# Patient Record
Sex: FEMALE | Race: WHITE | NOT HISPANIC OR LATINO | Employment: PART TIME | URBAN - METROPOLITAN AREA
[De-identification: names, ages, dates, MRNs, and addresses within clinical notes are randomized per-mention and may not be internally consistent; named-entity substitution may affect disease eponyms.]

---

## 2017-02-28 ASSESSMENT — PATIENT HEALTH QUESTIONNAIRE - PHQ9
10. IF YOU CHECKED OFF ANY PROBLEMS, HOW DIFFICULT HAVE THESE PROBLEMS MADE IT FOR YOU TO DO YOUR WORK, TAKE CARE OF THINGS AT HOME, OR GET ALONG WITH OTHER PEOPLE: VERY DIFFICULT
SUM OF ALL RESPONSES TO PHQ QUESTIONS 1-9: 22

## 2017-03-01 ENCOUNTER — OFFICE VISIT (OUTPATIENT)
Dept: FAMILY MEDICINE | Facility: CLINIC | Age: 52
End: 2017-03-01
Attending: FAMILY MEDICINE
Payer: COMMERCIAL

## 2017-03-01 VITALS — HEIGHT: 64 IN | SYSTOLIC BLOOD PRESSURE: 146 MMHG | DIASTOLIC BLOOD PRESSURE: 92 MMHG | HEART RATE: 65 BPM

## 2017-03-01 DIAGNOSIS — F43.23 ADJUSTMENT DISORDER WITH MIXED ANXIETY AND DEPRESSED MOOD: ICD-10-CM

## 2017-03-01 DIAGNOSIS — E03.9 HYPOTHYROIDISM, UNSPECIFIED TYPE: Primary | ICD-10-CM

## 2017-03-01 DIAGNOSIS — Z13.220 LIPID SCREENING: ICD-10-CM

## 2017-03-01 LAB
CHOLEST SERPL-MCNC: 231 MG/DL
HDLC SERPL-MCNC: 72 MG/DL
LDLC SERPL CALC-MCNC: 137 MG/DL
NONHDLC SERPL-MCNC: 159 MG/DL
T4 FREE SERPL-MCNC: 1.37 NG/DL (ref 0.76–1.46)
TRIGL SERPL-MCNC: 110 MG/DL
TSH SERPL DL<=0.05 MIU/L-ACNC: 1.14 MU/L (ref 0.4–4)

## 2017-03-01 PROCEDURE — 84439 ASSAY OF FREE THYROXINE: CPT | Performed by: FAMILY MEDICINE

## 2017-03-01 PROCEDURE — 36415 COLL VENOUS BLD VENIPUNCTURE: CPT | Performed by: FAMILY MEDICINE

## 2017-03-01 PROCEDURE — 80061 LIPID PANEL: CPT | Performed by: FAMILY MEDICINE

## 2017-03-01 PROCEDURE — 99212 OFFICE O/P EST SF 10 MIN: CPT | Mod: ZF

## 2017-03-01 PROCEDURE — 84443 ASSAY THYROID STIM HORMONE: CPT | Performed by: FAMILY MEDICINE

## 2017-03-01 RX ORDER — ESCITALOPRAM OXALATE 10 MG/1
10 TABLET ORAL DAILY
Qty: 30 TABLET | Refills: 0 | Status: SHIPPED | OUTPATIENT
Start: 2017-03-01 | End: 2017-03-29

## 2017-03-01 ASSESSMENT — ANXIETY QUESTIONNAIRES
3. WORRYING TOO MUCH ABOUT DIFFERENT THINGS: MORE THAN HALF THE DAYS
7. FEELING AFRAID AS IF SOMETHING AWFUL MIGHT HAPPEN: SEVERAL DAYS
1. FEELING NERVOUS, ANXIOUS, OR ON EDGE: MORE THAN HALF THE DAYS
5. BEING SO RESTLESS THAT IT IS HARD TO SIT STILL: NOT AT ALL
6. BECOMING EASILY ANNOYED OR IRRITABLE: NEARLY EVERY DAY
GAD7 TOTAL SCORE: 12
2. NOT BEING ABLE TO STOP OR CONTROL WORRYING: MORE THAN HALF THE DAYS

## 2017-03-01 ASSESSMENT — PATIENT HEALTH QUESTIONNAIRE - PHQ9: 5. POOR APPETITE OR OVEREATING: MORE THAN HALF THE DAYS

## 2017-03-01 NOTE — NURSING NOTE
Chief Complaint   Patient presents with     Follow Up For     Depression f/u/Discuss meds/TSH lab       See JENNIFER Diego 3/1/2017

## 2017-03-01 NOTE — MR AVS SNAPSHOT
After Visit Summary   3/1/2017    Cassandra Burgess    MRN: [id number]           Patient Information     Date Of Birth          1965        Visit Information        Provider Department      3/1/2017 11:00 AM Roland Narayan MD [facility]        Today's Diagnoses     Hypothyroidism, unspecified type    -  1    Lipid screening        Adjustment disorder with mixed anxiety and depressed mood           Follow-ups after your visit        Your next 10 appointments already scheduled     Mar 22, 2017 11:20 AM CDT   Return Visit with Roland Narayan MD   [facility] ([facility])    [facility]  [address]  [address]  [facility] [address]  [facility] [address]   [phone]              Who to contact     Please call your clinic at [phone] to:    Ask questions about your health    Make or cancel appointments    Discuss your medicines    Learn about your test results    Speak to your doctor   If you have compliments or concerns about an experience at your clinic, or if you wish to file a complaint, please contact [facility] Patient Relations at [phone] or email us at [email or web address]         Additional Information About Your Visit        MyChart Information     [employer] gives you secure access to your electronic health record. If you see a primary care provider, you can also send messages to your care team and make appointments. If you have questions, please call your primary care clinic.  If you do not have a primary care provider, please call [phone] and they will assist you.      [employer] is an electronic gateway that provides easy, online access to your medical records. With [employer], you can request a clinic appointment, read your test results, renew a prescription or communicate with your care team.     To access your existing account, please contact your [facility]  "Clinic or call [phone] for assistance.        Care EveryWhere ID     This is your Care EveryWhere ID. This could be used by other organizations to access your [place] medical records  [id number]        Your Vitals Were     Pulse Height Breastfeeding?             65 1.613 m (5' 3.5\") No          Blood Pressure from Last 3 Encounters:   03/01/17 (!) 146/92   07/13/16 124/85   05/18/16 135/88    Weight from Last 3 Encounters:   07/13/16 90.3 kg (199 lb)   05/18/16 85.3 kg (188 lb)              We Performed the Following     Lipid Profile     Thyroxine, Free     TSH          Today's Medication Changes          These changes are accurate as of: 3/1/17 12:11 PM.  If you have any questions, ask your nurse or doctor.               Start taking these medicines.        Dose/Directions    escitalopram 10 MG tablet   Commonly known as:  LEXAPRO   Used for:  Adjustment disorder with mixed anxiety and depressed mood   Started by:  Roland Narayan MD        Dose:  10 mg   Take 1 tablet (10 mg) by mouth daily   Quantity:  30 tablet   Refills:  0            Where to get your medicines      These medications were sent to [place] [id number] [place] [id number]    Hours:  M-F 9-7 SAT 9-6 SUN 11-3 Phone:  [phone]     escitalopram 10 MG tablet                Primary Care Provider Office Phone # Fax #    Becky Gaspar [phone]       [address] [address]        Thank you!     Thank you for choosing [facility]  for your care. Our goal is always to provide you with excellent care. Hearing back from our patients is one way we can continue to improve our services. Please take a few minutes to complete the written survey that you may receive in the mail after your visit with us. Thank you!             Your Updated Medication List - Protect others around you: Learn how " to safely use, store and throw away your medicines at www.disposemymeds.org.          This list is accurate as of: 3/1/17 12:11 PM.  Always use your most recent med list.                   Brand Name Dispense Instructions for use    CALCIUM MAGNESIUM PO      Take by mouth daily       escitalopram 10 MG tablet    LEXAPRO    30 tablet    Take 1 tablet (10 mg) by mouth daily       levothyroxine 137 MCG tablet    SYNTHROID/LEVOTHROID    90 tablet    Take 1 tablet (137 mcg) by mouth daily       MULTIVITAMIN PO      Take 4 capsules by mouth daily 50 and wiser women Raw whole food       OMEGA-3 FISH OIL PO          PROBIOTIC DAILY PO      Take by mouth daily       vitamin B complex with vitamin C Tabs tablet      Take 1 tablet by mouth daily       VITAMIN D3 PO      Take by mouth daily

## 2017-03-01 NOTE — PROGRESS NOTES
"HPI  Pt. Last here 7.2016.    1. 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 After being off for 1-2 months, she was offered another position at much higher pay, and she started work again. Since that time, work again has been stressful, workplace is disorganized per pt. And mood again has been very up and down, lack of motivation and energy, feel wiped out by end of day. 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 She had stopped therapy at last visit. No medications for mood.     Feels might need medication to manage finish out year, unable to do work well.   Both depression and anxiety symptoms  PHQ=9  18, +anhedonia, sig. Anhedonia, trouble stay asleep, fatigue, overeat, trouble concentrate, rare brief SI.   CARLOS 7=13, easily annoyed is highest, no panic attack    No prior use medication  ETOH--1/month or less  No recreational drug use; no family hx bipolar: mat grandfather and  Maternal uncle suicide, sister with depression  No hallucinations or delusions; No obsessions    2. Hypothyroid--She is due for repeat thyroid and lipid panel testing    Review of Systems     All other systems reviewed and are negative.        Physical Exam   Blood pressure (!) 146/92, pulse 65, height 1.613 m (5' 3.5\"), not currently breastfeeding.    Speech RRR, mood is anxious, no psychomotor changes, thought process is appropriate,   Affect is normal. No evidence of suicidality.     A/P  1. Adjustment disorder mixed anxiety and depressed mood  Discussed treatment options with patient, will start Lexapro 10 mg daily, reviewed possible side effects with patient.    2. Hypothyroid--check TSH, free T4, lipid panel    F/u 2-3 weeks, call if side effects from medication, especially significant worsening of mood          Answers for HPI/ROS submitted by the patient on 2/28/2017   PHQ-2 Depressed: More than half the days, More than half the days  PHQ-2 Score: 4  If you checked off any problems, how " difficult have these problems made it for you to do your work, take care of things at home, or get along with other people?: Very difficult  PHQ9 TOTAL SCORE: 22

## 2017-03-01 NOTE — LETTER
"3/1/2017       RE: Cassandra Burgess  [address] [facility] [address]     Dear Colleague,    Thank you for referring your patient, Cassandra Burgess, to the [facility] at [facility]. Please see a copy of my visit note below.    HPI  Pt. Last here 7.2016.    1. 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 After being off for 1-2 months, she was offered another position at much higher pay, and she started work again. Since that time, work again has been stressful, workplace is disorganized per pt. And mood again has been very up and down, lack of motivation and energy, feel wiped out by end of day. 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 She had stopped therapy at last visit. No medications for mood.     Feels might need medication to manage finish out year, unable to do work well.   Both depression and anxiety symptoms  PHQ=9  18, +anhedonia, sig. Anhedonia, trouble stay asleep, fatigue, overeat, trouble concentrate, rare brief SI.   CARLOS 7=13, easily annoyed is highest, no panic attack    No prior use medication  ETOH--1/month or less  No recreational drug use; no family hx bipolar: mat grandfather and  Maternal uncle suicide, sister with depression  No hallucinations or delusions; No obsessions    2. Hypothyroid--She is due for repeat thyroid and lipid panel testing    Review of Systems     All other systems reviewed and are negative.        Physical Exam   Blood pressure (!) 146/92, pulse 65, height 1.613 m (5' 3.5\"), not currently breastfeeding.    Speech RRR, mood is anxious, no psychomotor changes, thought process is appropriate,   Affect is normal. No evidence of suicidality.     A/P  1. Adjustment disorder mixed anxiety and depressed mood  Discussed treatment options with patient, will start Lexapro 10 mg daily, reviewed possible side effects with patient.    2. Hypothyroid--check TSH, free T4, lipid panel    F/u 2-3 " weeks, call if side effects from medication, especially significant worsening of mood          Answers for HPI/ROS submitted by the patient on 2/28/2017   PHQ-2 Depressed: More than half the days, More than half the days  PHQ-2 Score: 4  If you checked off any problems, how difficult have these problems made it for you to do your work, take care of things at home, or get along with other people?: Very difficult  PHQ9 TOTAL SCORE: 22      Again, thank you for allowing me to participate in the care of your patient.      Sincerely,    Roland Narayan MD

## 2017-03-02 ASSESSMENT — ANXIETY QUESTIONNAIRES: GAD7 TOTAL SCORE: 12

## 2017-03-08 NOTE — PROGRESS NOTES
Cassandra,    Your thyroid tests are normal--no change in medication. Your lipid tests are mildly high over all but your HDL (good cholesterol is excellent. Continue to eat a heart healthy diet, no evidence for cholesterol lowering medication at this time.     Roland Narayan MD

## 2017-03-22 ENCOUNTER — OFFICE VISIT (OUTPATIENT)
Dept: FAMILY MEDICINE | Facility: CLINIC | Age: 52
End: 2017-03-22
Attending: FAMILY MEDICINE
Payer: COMMERCIAL

## 2017-03-22 VITALS
DIASTOLIC BLOOD PRESSURE: 84 MMHG | BODY MASS INDEX: 34.15 KG/M2 | HEIGHT: 64 IN | HEART RATE: 60 BPM | WEIGHT: 200 LBS | SYSTOLIC BLOOD PRESSURE: 119 MMHG

## 2017-03-22 DIAGNOSIS — F41.8 DEPRESSION WITH ANXIETY: Primary | ICD-10-CM

## 2017-03-22 PROCEDURE — 99212 OFFICE O/P EST SF 10 MIN: CPT | Mod: ZF

## 2017-03-22 ASSESSMENT — PAIN SCALES - GENERAL: PAINLEVEL: NO PAIN (0)

## 2017-03-22 ASSESSMENT — PATIENT HEALTH QUESTIONNAIRE - PHQ9
10. IF YOU CHECKED OFF ANY PROBLEMS, HOW DIFFICULT HAVE THESE PROBLEMS MADE IT FOR YOU TO DO YOUR WORK, TAKE CARE OF THINGS AT HOME, OR GET ALONG WITH OTHER PEOPLE: EXTREMELY DIFFICULT
SUM OF ALL RESPONSES TO PHQ QUESTIONS 1-9: 16

## 2017-03-22 NOTE — PROGRESS NOTES
"HII:  Pt here for f/u adjustment disorder    She is taking Lexapro 10 mg daily, taking at night. No problems with medication. She thinks things have been a little bit better, but unceratin.    Answers for HPI/ROS submitted by the patient on 3/22/2017   Q1: Little interest or pleasure in doing things: 2=More than half the days  Q2: Feeling down, depressed or hopeless: 1=Several days  PHQ-2 Score: 3  If you checked off any problems, how difficult have these problems made it for you to do your work, take care of things at home, or get along with other people?: Extremely difficult  PHQ9 TOTAL SCORE: 16   +fatigue, anhedonia, both under and overeating, trouble concentrating.   Vague SI no plans. Fleeting    GAD7=11  Physical activity: 6000 steps/day    ROS  12 point ROS negative except where noted above    PE  Blood pressure 119/84, pulse 60, height 1.613 m (5' 3.5\"), weight 90.7 kg (200 lb), not currently breastfeeding.    Speech RRR, mood is depressed and mildly anxious,  no psychomotor changes, thought process is appropriate,   Affect is normal. No evidence of suicidality.     A/P  1. MDD  2. Anxiety  She has been on medication for just 3 weeks, with little improvement, but tolerating well. Discuss options for care. Will continue lexapro 10 mg daily x 2 weeks, if not significantly  improved, pt. To call and will increase to 20mg. If no better on increased dose x 3 weeks,  will add or change med  Increase activity towards  10,000 steps/day    F/u 1-2 months depending on response  "

## 2017-03-22 NOTE — LETTER
"3/22/2017       RE: Cassandra Burgess  [address] [facility] [address]     Dear Colleague,    Thank you for referring your patient, Cassandra Burgess, to the [facility] at [facility]. Please see a copy of my visit note below.    HII:  Pt here for f/u adjustment disorder    She is taking Lexapro 10 mg daily, taking at night. No problems with medication. She thinks things have been a little bit better, but unceratin.    Answers for HPI/ROS submitted by the patient on 3/22/2017   Q1: Little interest or pleasure in doing things: 2=More than half the days  Q2: Feeling down, depressed or hopeless: 1=Several days  PHQ-2 Score: 3  If you checked off any problems, how difficult have these problems made it for you to do your work, take care of things at home, or get along with other people?: Extremely difficult  PHQ9 TOTAL SCORE: 16   +fatigue, anhedonia, both under and overeating, trouble concentrating.   Vague SI no plans. Fleeting    GAD7=11  Physical activity: 6000 steps/day    ROS  12 point ROS negative except where noted above    PE  Blood pressure 119/84, pulse 60, height 1.613 m (5' 3.5\"), weight 90.7 kg (200 lb), not currently breastfeeding.    Speech RRR, mood is depressed and mildly anxious,  no psychomotor changes, thought process is appropriate,   Affect is normal. No evidence of suicidality.     A/P  1. MDD  2. Anxiety  She has been on medication for just 3 weeks, with little improvement, but tolerating well. Discuss options for care. Will continue lexapro 10 mg daily x 2 weeks, if not significantly  improved, pt. To call and will increase to 20mg. If no better on increased dose x 3 weeks,  will add or change med  Increase activity towards  10,000 steps/day    F/u 1-2 months depending on response    Again, thank you for allowing me to participate in the care of your patient.      Sincerely,    Roland Narayan MD      "

## 2017-03-22 NOTE — MR AVS SNAPSHOT
After Visit Summary   3/22/2017    Cassandra Burgess    MRN: [id number]           Patient Information     Date Of Birth          1965        Visit Information        Provider Department      3/22/2017 11:20 AM Roland Narayan MD [facility]        Today's Diagnoses     Depression with anxiety    -  1       Follow-ups after your visit        Follow-up notes from your care team     Return in about 1 month (around 4/22/2017).      Who to contact     Please call your clinic at [phone] to:    Ask questions about your health    Make or cancel appointments    Discuss your medicines    Learn about your test results    Speak to your doctor   If you have compliments or concerns about an experience at your clinic, or if you wish to file a complaint, please contact [facility] Patient Relations at [phone] or email us at [email or web address]         Additional Information About Your Visit        MyChart Information     [employer] gives you secure access to your electronic health record. If you see a primary care provider, you can also send messages to your care team and make appointments. If you have questions, please call your primary care clinic.  If you do not have a primary care provider, please call [phone] and they will assist you.      Buffer is an electronic gateway that provides easy, online access to your medical records. With Buffer, you can request a clinic appointment, read your test results, renew a prescription or communicate with your care team.     To access your existing account, please contact your [facility] or call [phone] for assistance.        Care EveryWhere ID     This is your Care EveryWhere ID. This could be used by other organizations to access your [place] medical records  [id number]        Your Vitals Were     Pulse Height BMI (Body Mass Index)             60 1.613 m (5'  "3.5\") 34.87 kg/m2          Blood Pressure from Last 3 Encounters:   03/22/17 119/84   03/01/17 (!) 146/92   07/13/16 124/85    Weight from Last 3 Encounters:   03/22/17 90.7 kg (200 lb)   07/13/16 90.3 kg (199 lb)   05/18/16 85.3 kg (188 lb)              Today, you had the following     No orders found for display       Primary Care Provider Office Phone # Fax #    Becky Gaspar [phone]       [place] [id number]        Thank you!     Thank you for choosing [facility]  for your care. Our goal is always to provide you with excellent care. Hearing back from our patients is one way we can continue to improve our services. Please take a few minutes to complete the written survey that you may receive in the mail after your visit with us. Thank you!             Your Updated Medication List - Protect others around you: Learn how to safely use, store and throw away your medicines at www.disposemymeds.org.          This list is accurate as of: 3/22/17 11:59 PM.  Always use your most recent med list.                   Brand Name Dispense Instructions for use    CALCIUM MAGNESIUM PO      Take by mouth daily       escitalopram 10 MG tablet    LEXAPRO    30 tablet    Take 1 tablet (10 mg) by mouth daily       levothyroxine 137 MCG tablet    SYNTHROID/LEVOTHROID    90 tablet    Take 1 tablet (137 mcg) by mouth daily       MULTIVITAMIN PO      Take 4 capsules by mouth daily 50 and wiser women Raw whole food       OMEGA-3 FISH OIL PO          PROBIOTIC DAILY PO      Take by mouth daily       vitamin B complex with vitamin C Tabs tablet      Take 1 tablet by mouth daily       VITAMIN D3 PO      Take by mouth daily         "

## 2017-03-23 ASSESSMENT — PATIENT HEALTH QUESTIONNAIRE - PHQ9: SUM OF ALL RESPONSES TO PHQ QUESTIONS 1-9: 16

## 2017-03-29 DIAGNOSIS — F43.23 ADJUSTMENT DISORDER WITH MIXED ANXIETY AND DEPRESSED MOOD: ICD-10-CM

## 2017-03-29 RX ORDER — ESCITALOPRAM OXALATE 10 MG/1
10 TABLET ORAL DAILY
Qty: 30 TABLET | Refills: 0 | Status: SHIPPED | OUTPATIENT
Start: 2017-03-29 | End: 2017-04-26

## 2017-03-29 NOTE — TELEPHONE ENCOUNTER
Received refill request for escitalopram 10mg daily.  Last in clinic 3/2017 where this med was to be continued for two more weeks, pt to call if still no effect and needing an increase.      Spoke with Cassandra who stated she is not done with the 'two more weeks' but feels like its starting to work. Advised short-term refill will be given, let us know how it is working. She indicated understanding and agreed with plan.

## 2017-04-26 DIAGNOSIS — F43.23 ADJUSTMENT DISORDER WITH MIXED ANXIETY AND DEPRESSED MOOD: ICD-10-CM

## 2017-04-26 RX ORDER — ESCITALOPRAM OXALATE 10 MG/1
10 TABLET ORAL DAILY
Qty: 30 TABLET | Refills: 1 | Status: SHIPPED | OUTPATIENT
Start: 2017-04-26 | End: 2017-06-28

## 2017-04-26 NOTE — TELEPHONE ENCOUNTER
Spoke with Cassandra who feels that the 10mg escitalopram is working very well for her. She has been on this dose since March and would like to continue. She has 3 pills left today. Advised that we will route her refill request to Dr. Narayan and let her know plan.

## 2017-04-26 NOTE — TELEPHONE ENCOUNTER
Received refill request for escitalopram. Patient was to call to let Dr. Narayan how she was doing on 10mg and if it needed to be increased. Nurse left message for Cassandra to call back and let nurses know.

## 2017-04-27 NOTE — TELEPHONE ENCOUNTER
Spoke with pt and let her know Dr. Narayan would like to see her for follow up in 2 months. She understood plan and had no further questions.

## 2017-04-27 NOTE — TELEPHONE ENCOUNTER
Dr. Narayan would like patient to follow up in 2 months. Left VM for pt to call back to discuss and schedule.

## 2017-06-02 DIAGNOSIS — E03.9 HYPOTHYROIDISM, UNSPECIFIED TYPE: ICD-10-CM

## 2017-06-02 RX ORDER — LEVOTHYROXINE SODIUM 137 UG/1
137 TABLET ORAL DAILY
Qty: 90 TABLET | Refills: 0 | Status: SHIPPED | OUTPATIENT
Start: 2017-06-02 | End: 2017-07-26

## 2017-06-02 NOTE — TELEPHONE ENCOUNTER
Received refill request for levothyroxine.  Last in clinic 3/22/17 and thyroid labs done 3/1/17 with note form Dr Narayan that tests normal and no changes needed to thyroid medication. Refill sent.

## 2017-06-28 DIAGNOSIS — F43.23 ADJUSTMENT DISORDER WITH MIXED ANXIETY AND DEPRESSED MOOD: ICD-10-CM

## 2017-06-28 RX ORDER — ESCITALOPRAM OXALATE 10 MG/1
10 TABLET ORAL DAILY
Qty: 30 TABLET | Refills: 0 | Status: SHIPPED | OUTPATIENT
Start: 2017-06-28 | End: 2017-07-26

## 2017-06-28 NOTE — TELEPHONE ENCOUNTER
Received refill request for lexapro. Patient has upcoming annual appt. Refilled to get her through until her appointment.

## 2017-07-24 ASSESSMENT — ENCOUNTER SYMPTOMS
STIFFNESS: 0
MUSCLE CRAMPS: 0
MYALGIAS: 1
WEIGHT LOSS: 0
CHILLS: 0
POLYPHAGIA: 0
INCREASED ENERGY: 0
TROUBLE SWALLOWING: 0
TREMORS: 0
MUSCLE WEAKNESS: 0
SMELL DISTURBANCE: 0
FEVER: 0
PARALYSIS: 0
SPEECH CHANGE: 0
POLYDIPSIA: 0
JOINT SWELLING: 0
ALTERED TEMPERATURE REGULATION: 0
NUMBNESS: 1
SEIZURES: 0
DIZZINESS: 1
LOSS OF CONSCIOUSNESS: 0
MEMORY LOSS: 0
NECK PAIN: 1
HOARSE VOICE: 0
DISTURBANCES IN COORDINATION: 0
SORE THROAT: 0
TASTE DISTURBANCE: 0
DECREASED APPETITE: 0
HALLUCINATIONS: 0
BACK PAIN: 1
WEAKNESS: 0
TINGLING: 1
SINUS PAIN: 0
FATIGUE: 1
NIGHT SWEATS: 0
WEIGHT GAIN: 0
HEADACHES: 0
ARTHRALGIAS: 0
SINUS CONGESTION: 0
NECK MASS: 0

## 2017-07-24 ASSESSMENT — ANXIETY QUESTIONNAIRES
1. FEELING NERVOUS, ANXIOUS, OR ON EDGE: NOT AT ALL
GAD7 TOTAL SCORE: 1
7. FEELING AFRAID AS IF SOMETHING AWFUL MIGHT HAPPEN: NOT AT ALL
GAD7 TOTAL SCORE: 1
5. BEING SO RESTLESS THAT IT IS HARD TO SIT STILL: NOT AT ALL
3. WORRYING TOO MUCH ABOUT DIFFERENT THINGS: NOT AT ALL
2. NOT BEING ABLE TO STOP OR CONTROL WORRYING: NOT AT ALL
GAD7 TOTAL SCORE: 1
4. TROUBLE RELAXING: NOT AT ALL
6. BECOMING EASILY ANNOYED OR IRRITABLE: SEVERAL DAYS
7. FEELING AFRAID AS IF SOMETHING AWFUL MIGHT HAPPEN: NOT AT ALL

## 2017-07-25 ASSESSMENT — ANXIETY QUESTIONNAIRES: GAD7 TOTAL SCORE: 1

## 2017-07-26 ENCOUNTER — OFFICE VISIT (OUTPATIENT)
Dept: FAMILY MEDICINE | Facility: CLINIC | Age: 52
End: 2017-07-26
Attending: FAMILY MEDICINE
Payer: COMMERCIAL

## 2017-07-26 VITALS
DIASTOLIC BLOOD PRESSURE: 86 MMHG | HEIGHT: 64 IN | SYSTOLIC BLOOD PRESSURE: 131 MMHG | WEIGHT: 205.1 LBS | HEART RATE: 64 BPM | BODY MASS INDEX: 35.01 KG/M2

## 2017-07-26 DIAGNOSIS — Z23 NEED FOR DTAP VACCINE: ICD-10-CM

## 2017-07-26 DIAGNOSIS — L98.9 SKIN LESION: Primary | ICD-10-CM

## 2017-07-26 DIAGNOSIS — Z00.00 ENCOUNTER FOR ROUTINE ADULT HEALTH EXAMINATION WITHOUT ABNORMAL FINDINGS: ICD-10-CM

## 2017-07-26 DIAGNOSIS — E03.9 HYPOTHYROIDISM, UNSPECIFIED TYPE: ICD-10-CM

## 2017-07-26 PROCEDURE — 25000128 H RX IP 250 OP 636: Mod: ZF

## 2017-07-26 PROCEDURE — 90715 TDAP VACCINE 7 YRS/> IM: CPT | Mod: ZF

## 2017-07-26 PROCEDURE — 90471 IMMUNIZATION ADMIN: CPT | Mod: ZF

## 2017-07-26 PROCEDURE — 99212 OFFICE O/P EST SF 10 MIN: CPT | Mod: ZF

## 2017-07-26 RX ORDER — LEVOTHYROXINE SODIUM 137 UG/1
137 TABLET ORAL DAILY
Qty: 90 TABLET | Refills: 2 | Status: SHIPPED | OUTPATIENT
Start: 2017-07-26 | End: 2018-06-28

## 2017-07-26 NOTE — LETTER
2017       RE: Cassandra Burgess  [address] [facility] [address]     Dear Colleague,    Thank you for referring your patient, Cassandra Burgess, to the [facility] at [facility]. Please see a copy of my visit note below.    Pt. Here for CPE     1.. New small skin lesion R inner upper arm x 2 mos. Not itching  2. Hypothyroid--Continues on 137 mcg levothyroxine. Feeling about the same, TSH and T4 in range in 3/2017.  3. Depression and anxiety--in remission, off medication. In March, had been adjusting Lexapro, did better. Then end of school year came and felt much better. 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 Stopped lexapro over 1 month ago, no side effects. PHQ and CARLOS 7 scores below, feeling well.   5. Breast--no other concerns, due mammogram  6. Gyne-- LMP-- approx, s/p hysterectomy for fibroids, ovaries left. Menses regular prior to that. Paps normal except right after childbearing, with normal colposcopy. Mild night sweats, tolerable. No vaginal symptoms. Some urinary leaking when cough/sneeze, tolerable.  no urgency. Does not do Kegel's.  (twins) Vaginal with 1 breech (flipped). Near 7 and 8 #. (children age 25+ now).  (x 4 yrs,  x 25 years) Not currently sexually active with partner. No current sexual concerns. No hx STI ( did have herpes). Started hep B vaccine but never finished.  Hep C negative.  Hx of working in special education, with some body fluid exposures.   7. Bone--eat dairy, some. Calcium supplement irregular. Vit D supplement: taking, unknown dose. Activity: walking 10,000 steps/day    8. HCM-- last recorded, dtaP , colonsocopy normal , lipids 2017  HDL 72, glucose?      PMH  hysterectomy      Family History           Family History   Problem Relation Age of Onset      Diabetes Maternal Grandmother       Depression Sister       Depression Sister       Multiple Sclerosis Brother       Breast Cancer Niece       Anxiety Disorder Daughter       Depression Sister       Depression Sister       Genetic Disorder Nephew         cystic fibrosis     Thyroid Disease Sister           Mother--well  Father--d. Lung cancer  Mat grandfather--bipolar, mat uncle bipolar (both suicide)  No changes since 2016     SH  Standard diet  ETOH--1/month, 1/sitting  No tobacco  Activity above    ROS:  Answers for HPI/ROS submitted by the patient on 7/24/2017   CARLOS 7 TOTAL SCORE: 1  PHQ-2 Score: 1  General Symptoms: Yes  Skin Symptoms: No  HENT Symptoms: Yes  EYE SYMPTOMS: No  HEART SYMPTOMS: No  LUNG SYMPTOMS: No  INTESTINAL SYMPTOMS: No  URINARY SYMPTOMS: No  GYNECOLOGIC SYMPTOMS: No  BREAST SYMPTOMS: No  SKELETAL SYMPTOMS: Yes  BLOOD SYMPTOMS: No  NERVOUS SYSTEM SYMPTOMS: Yes  MENTAL HEALTH SYMPTOMS: No  Fever: No  Loss of appetite: No  Weight loss: No  Weight gain: No  Fatigue: Yes  Night sweats: No  Chills: No  Increased stress: No  Excessive hunger: No  Excessive thirst: No  Feeling hot or cold when others believe the temperature is normal: No  Loss of height: No  Post-operative complications: No  Surgical site pain: No  Hallucinations: No  Change in or Loss of Energy: No  Hyperactivity: No  Confusion: No  Ear pain: Yes--gone now  Ear discharge: No  Hearing loss: No  Tinnitus: Yes  Nosebleeds: No  Congestion: No  Sinus pain: No  Trouble swallowing: No   Voice hoarseness: No  Mouth sores: No  Sore throat: No  Tooth pain: No  Gum tenderness: No  Bleeding gums: No  Change in taste: No  Change in sense of smell: No  Dry mouth: No  Hearing aid used: No  Neck lump: No  Back pain: Yes  Muscle aches: Yes  Neck pain: Yes--recent and now resolved  Swollen joints: No  Joint pain: No  Bone pain: No  Muscle cramps: No  Muscle weakness: No  Joint stiffness: No  Bone fracture: No  Trouble with coordination:  "No  Dizziness or trouble with balance: Yes--lighheadedness, change in position, bending and standing, turning intermittent  Fainting or black-out spells: No  Memory loss: No  Headache: No  Seizures: No  Speech problems: No  Tingling: Yes----toe, resovled  Tremor: No  Weakness: No  Difficulty walking: No  Paralysis: No  Numbness: Yes--R arm falls asleep, position changes    PE  Blood pressure 131/86, pulse 64, height 1.613 m (5' 3.5\"), weight 93 kg (205 lb 1.6 oz), not currently breastfeeding.  Constitutional: Well appearing woman in no acute distress.   Psychological: appropriate mood.  Eyes: anicteric, normal extra-ocular movements,  pupils are equal and reactive to light.   Ears, Nose and Throat: tympanic membranes clear, nose clear and free of lesions, throat clear, moist mucous membrames, neck supple with full range of motion.    Neck: No thyroidmegaly. No jugular venous distension, no carotid bruits.  Cardiovascular: regular rate and rhythm, normal S1 and S2, no murmurs, rubs or gallops, peripheral pulses decreased on R, but foot warm and pink, full pulse on L.   Respiratory: clear to auscultation, no wheezes or crackles, normal breath sounds.  Breast: Symmetrical without visible distortion or swelling. No masses noted. No nipple inversion, no breast dimpling or puckering. Axillary area without masses or lympadenapathy.   Gastrointestinal: positive bowel sounds, nontender, no hepatosplenomegaly, no masses. No guarding or rebound.  Genitourinary: External genitalia is normal appearance. No enlargement of the [place] or [place] glands. Urethra and bladder are non-tender. Vagina is without lesions or discharge. Normal epithelium, no anterior or posterior wall defects. Cervix is smooth, without lesions, no cervical motion tenderness. Uterus is normal size, shape, and contour. Adnexa without tenderness or enlarged. Rectal exam with normal sphincter tone, no masses. Perineum without lesions.   Lymphatic: no " lymphadenopathy.  Musculoskeletal: full range of motion, no edema and motor strength is equal in the upper and lower extremities    Skin: R forearm 4 mm patch mild hyperpigmentation ? Fine scale. Multiple areas sun damage on legs, arms, face; solar lentiges, no jaundice.  Neurological: cranial nerves intact, normal strength and sensation, reflexes at patella and biceps normal, normal gait, no tremor.   Monofilament Foot Exam: n/a  A/P  1. HCM  Due for dtaP, up to date on cancer screening, mammogram in March 2018. Continue weight bearing exercise and adequate Vit D in diet and supplement (total 600-1000 units/day)  2. Skin lesion with other sun damage--refer to dermatology for evaluation  3. Depression in remission off medication--monitor  4. Lightheaded with position change--discussed potential causes, increase in orthostatic symtoms with age, monitor and re-evaluate if symptoms   5. Hypothyroid--no changes, repeat testing in March    F/u March    Again, thank you for allowing me to participate in the care of your patient.      Sincerely,    Roland Narayan MD

## 2017-07-26 NOTE — LETTER
2017     RE: Cassandra Burgess  [address] [facility] [address]     Dear Colleague,    Thank you for referring your patient, Cassandra Burgess, to the [facility] at [facility]. Please see a copy of my visit note below.    Pt. Here for CPE     1.. New small skin lesion R inner upper arm x 2 mos. Not itching  2. Hypothyroid--Continues on 137 mcg levothyroxine. Feeling about the same, TSH and T4 in range in 3/2017.  3. Depression and anxiety--in remission, off medication. In March, had been adjusting Lexapro, did better. Then end of school year came and felt much better. 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 Stopped lexapro over 1 month ago, no side effects. PHQ and CARLOS 7 scores below, feeling well.   5. Breast--no other concerns, due mammogram  6. Gyne-- LMP-- approx, s/p hysterectomy for fibroids, ovaries left. Menses regular prior to that. Paps normal except right after childbearing, with normal colposcopy. Mild night sweats, tolerable. No vaginal symptoms. Some urinary leaking when cough/sneeze, tolerable.  no urgency. Does not do Kegel's.  (twins) Vaginal with 1 breech (flipped). Near 7 and 8 #. (children age 25+ now).  (x 4 yrs,  x 25 years) Not currently sexually active with partner. No current sexual concerns. No hx STI ( did have herpes). Started hep B vaccine but never finished.  Hep C negative.  Hx of working in special education, with some body fluid exposures.   7. Bone--eat dairy, some. Calcium supplement irregular. Vit D supplement: taking, unknown dose. Activity: walking 10,000 steps/day    8. HCM-- last recorded, dtaP , colonsocopy normal , lipids 2017  HDL 72, glucose?      PMH  hysterectomy      Family History           Family History   Problem Relation Age of Onset      Diabetes Maternal Grandmother       Depression Sister       Depression Sister       Multiple Sclerosis Brother       Breast Cancer Niece       Anxiety Disorder Daughter       Depression Sister       Depression Sister       Genetic Disorder Nephew         cystic fibrosis     Thyroid Disease Sister           Mother--well  Father--d. Lung cancer  Mat grandfather--bipolar, mat uncle bipolar (both suicide)  No changes since 2016     SH  Standard diet  ETOH--1/month, 1/sitting  No tobacco  Activity above    ROS:  Answers for HPI/ROS submitted by the patient on 7/24/2017   CARLOS 7 TOTAL SCORE: 1  PHQ-2 Score: 1  General Symptoms: Yes  Skin Symptoms: No  HENT Symptoms: Yes  EYE SYMPTOMS: No  HEART SYMPTOMS: No  LUNG SYMPTOMS: No  INTESTINAL SYMPTOMS: No  URINARY SYMPTOMS: No  GYNECOLOGIC SYMPTOMS: No  BREAST SYMPTOMS: No  SKELETAL SYMPTOMS: Yes  BLOOD SYMPTOMS: No  NERVOUS SYSTEM SYMPTOMS: Yes  MENTAL HEALTH SYMPTOMS: No  Fever: No  Loss of appetite: No  Weight loss: No  Weight gain: No  Fatigue: Yes  Night sweats: No  Chills: No  Increased stress: No  Excessive hunger: No  Excessive thirst: No  Feeling hot or cold when others believe the temperature is normal: No  Loss of height: No  Post-operative complications: No  Surgical site pain: No  Hallucinations: No  Change in or Loss of Energy: No  Hyperactivity: No  Confusion: No  Ear pain: Yes--gone now  Ear discharge: No  Hearing loss: No  Tinnitus: Yes  Nosebleeds: No  Congestion: No  Sinus pain: No  Trouble swallowing: No   Voice hoarseness: No  Mouth sores: No  Sore throat: No  Tooth pain: No  Gum tenderness: No  Bleeding gums: No  Change in taste: No  Change in sense of smell: No  Dry mouth: No  Hearing aid used: No  Neck lump: No  Back pain: Yes  Muscle aches: Yes  Neck pain: Yes--recent and now resolved  Swollen joints: No  Joint pain: No  Bone pain: No  Muscle cramps: No  Muscle weakness: No  Joint stiffness: No  Bone fracture: No  Trouble with coordination:  "No  Dizziness or trouble with balance: Yes--lighheadedness, change in position, bending and standing, turning intermittent  Fainting or black-out spells: No  Memory loss: No  Headache: No  Seizures: No  Speech problems: No  Tingling: Yes----toe, resovled  Tremor: No  Weakness: No  Difficulty walking: No  Paralysis: No  Numbness: Yes--R arm falls asleep, position changes    PE  Blood pressure 131/86, pulse 64, height 1.613 m (5' 3.5\"), weight 93 kg (205 lb 1.6 oz), not currently breastfeeding.  Constitutional: Well appearing woman in no acute distress.   Psychological: appropriate mood.  Eyes: anicteric, normal extra-ocular movements,  pupils are equal and reactive to light.   Ears, Nose and Throat: tympanic membranes clear, nose clear and free of lesions, throat clear, moist mucous membrames, neck supple with full range of motion.    Neck: No thyroidmegaly. No jugular venous distension, no carotid bruits.  Cardiovascular: regular rate and rhythm, normal S1 and S2, no murmurs, rubs or gallops, peripheral pulses decreased on R, but foot warm and pink, full pulse on L.   Respiratory: clear to auscultation, no wheezes or crackles, normal breath sounds.  Breast: Symmetrical without visible distortion or swelling. No masses noted. No nipple inversion, no breast dimpling or puckering. Axillary area without masses or lympadenapathy.   Gastrointestinal: positive bowel sounds, nontender, no hepatosplenomegaly, no masses. No guarding or rebound.  Genitourinary: External genitalia is normal appearance. No enlargement of the [place]tholin or [place] glands. Urethra and bladder are non-tender. Vagina is without lesions or discharge. Normal epithelium, no anterior or posterior wall defects. Cervix is smooth, without lesions, no cervical motion tenderness. Uterus is normal size, shape, and contour. Adnexa without tenderness or enlarged. Rectal exam with normal sphincter tone, no masses. Perineum without lesions.   Lymphatic: no " lymphadenopathy.  Musculoskeletal: full range of motion, no edema and motor strength is equal in the upper and lower extremities    Skin: R forearm 4 mm patch mild hyperpigmentation ? Fine scale. Multiple areas sun damage on legs, arms, face; solar lentiges, no jaundice.  Neurological: cranial nerves intact, normal strength and sensation, reflexes at patella and biceps normal, normal gait, no tremor.   Monofilament Foot Exam: n/a      A/P  1. HCM  Due for dtaP, up to date on cancer screening, mammogram in March 2018. Continue weight bearing exercise and adequate Vit D in diet and supplement (total 600-1000 units/day)  2. Skin lesion with other sun damage--refer to dermatology for evaluation  3. Depression in remission off medication--monitor  4. Lightheaded with position change--discussed potential causes, increase in orthostatic symtoms with age, monitor and re-evaluate if symptoms   5. Hypothyroid--no changes, repeat testing in March    F/u March    Again, thank you for allowing me to participate in the care of your patient.      Sincerely,    Roland Narayan MD

## 2017-07-26 NOTE — MR AVS SNAPSHOT
After Visit Summary   7/26/2017    Cassandra Burgess    MRN: [id number]           Patient Information     Date Of Birth          1965        Visit Information        Provider Department      7/26/2017 9:20 AM Roland Narayan MD [facility]        Today's Diagnoses     Skin lesion    -  1    Hypothyroidism, unspecified type        Need for DTaP vaccine           Follow-ups after your visit        Additional Services     DERMATOLOGY REFERRAL       Your provider has referred you to: PREFERRED PROVIDERS:  [facility]: Dermatology Clinic [facility] [phone]   [email or web address]    Please be aware that coverage of these services is subject to the terms and limitations of your health insurance plan.  Call member services at your health plan with any benefit or coverage questions.      Please bring the following with you to your appointment:    (1) Any X-Rays, CTs or MRIs which have been performed.  Contact the facility where they were done to arrange for  prior to your scheduled appointment.    (2) List of current medications  (3) This referral request   (4) Any documents/labs given to you for this referral                  Your next 10 appointments already scheduled     Sep 18, 2017  9:30 AM CDT   New Patient Visit with Krissy Uribe MD   [facility] ([facility])    [address]  [address]  [facility]  [facility] [address] [phone]              Who to contact     Please call your clinic at [phone] to:    Ask questions about your health    Make or cancel appointments    Discuss your medicines    Learn about your test results    Speak to your doctor   If you have compliments or concerns about an experience at your clinic, or if you wish to file a complaint, please contact [facility] Patient Relations at [phone] or email us at  [email or web address]         Additional Information About Your Visit        [employer] Information     [employer] gives you secure access to your electronic health record. If you see a primary care provider, you can also send messages to your care team and make appointments. If you have questions, please call your primary care clinic.  If you do not have a primary care provider, please call [phone] and they will assist you.      [employer] is an electronic gateway that provides easy, online access to your medical records. With [employer], you can request a clinic appointment, read your test results, renew a prescription or communicate with your care team.     To access your existing account, please contact your [facility] or call [phone] for assistance.        Care EveryWhere ID     This is your Care EveryWhere ID. This could be used by other organizations to access your [place] medical records  [id number]        Your Vitals Were     Pulse Height BMI (Body Mass Index)             64 1.613 m (5' 3.5\") 35.76 kg/m2          Blood Pressure from Last 3 Encounters:   07/26/17 131/86   03/22/17 119/84   03/01/17 (!) 146/92    Weight from Last 3 Encounters:   07/26/17 93 kg (205 lb 1.6 oz)   03/22/17 90.7 kg (200 lb)   07/13/16 90.3 kg (199 lb)              We Performed the Following     DERMATOLOGY REFERRAL     TDAP VACCINE (BOOSTRIX)          Where to get your medicines      These medications were sent to [place] [address] [place] [address] [address]    Hours:  M-F 9-7 SAT 9-6 SUN 11-3 Phone:  [phone]     levothyroxine 137 MCG tablet          Primary Care Provider Office Phone # Fax #    Becky Gaspar [phone]       [address] [address]        Equal Access to Services     SERINA MENDEZ AH: calvin Sanabria, " rivera aleknorahedward rodriguezscott justin winnierosemary stephenson. So [facility] [phone].    ATENCIÓN: Si isauro ayers, tiene a mabry disposición servicios gratuitos de asistencia lingüística. Michael al [phone].    We comply with applicable federal civil rights laws and [place] laws. We do not discriminate on the basis of race, color, national origin, age, disability sex, sexual orientation or gender identity.            Thank you!     Thank you for choosing [facility]  for your care. Our goal is always to provide you with excellent care. Hearing back from our patients is one way we can continue to improve our services. Please take a few minutes to complete the written survey that you may receive in the mail after your visit with us. Thank you!             Your Updated Medication List - Protect others around you: Learn how to safely use, store and throw away your medicines at www.disposemymeds.org.          This list is accurate as of: 7/26/17 10:22 AM.  Always use your most recent med list.                   Brand Name Dispense Instructions for use Diagnosis    CALCIUM MAGNESIUM PO      Take by mouth daily        levothyroxine 137 MCG tablet    SYNTHROID/LEVOTHROID    90 tablet    Take 1 tablet (137 mcg) by mouth daily    Hypothyroidism, unspecified type       MULTIVITAMIN PO      Take 4 capsules by mouth daily 50 and wiser women Raw whole food        OMEGA-3 FISH OIL PO           PROBIOTIC DAILY PO      Take by mouth daily        vitamin B complex with vitamin C Tabs tablet      Take 1 tablet by mouth daily        VITAMIN D3 PO      Take by mouth daily

## 2017-07-26 NOTE — PROGRESS NOTES
Pt. Here for CPE     1.. New small skin lesion R inner upper arm x 2 mos. Not itching  2. Hypothyroid--Continues on 137 mcg levothyroxine. Feeling about the same, TSH and T4 in range in 3/2017.  3. Depression and anxiety--in remission, off medication. In March, had been adjusting Lexapro, did better. Then end of school year came and felt much better. 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 Stopped lexapro over 1 month ago, no side effects. PHQ and CARLOS 7 scores below, feeling well.   5. Breast--no other concerns, due mammogram  6. Gyne-- LMP-- approx, s/p hysterectomy for fibroids, ovaries left. Menses regular prior to that. Paps normal except right after childbearing, with normal colposcopy. Mild night sweats, tolerable. No vaginal symptoms. Some urinary leaking when cough/sneeze, tolerable.  no urgency. Does not do Kegel's.  (twins) Vaginal with 1 breech (flipped). Near 7 and 8 #. (children age 25+ now).  (x 4 yrs,  x 25 years) Not currently sexually active with partner. No current sexual concerns. No hx STI ( did have herpes). Started hep B vaccine but never finished.  Hep C negative.  Hx of working in special education, with some body fluid exposures.   7. Bone--eat dairy, some. Calcium supplement irregular. Vit D supplement: taking, unknown dose. Activity: walking 10,000 steps/day    8. HCM-- last recorded, dtaP , colonsocopy normal , lipids 2017  HDL 72, glucose?      PMH  hysterectomy      Family History           Family History   Problem Relation Age of Onset     Diabetes Maternal Grandmother       Depression Sister       Depression Sister       Multiple Sclerosis Brother       Breast Cancer Niece       Anxiety Disorder Daughter       Depression Sister       Depression Sister       Genetic Disorder Nephew         cystic fibrosis      Thyroid Disease Sister           Mother--well  Father--d. Lung cancer  Mat grandfather--bipolar, mat uncle bipolar (both suicide)  No changes since 2016     SH  Standard diet  ETOH--1/month, 1/sitting  No tobacco  Activity above    ROS:  Answers for HPI/ROS submitted by the patient on 7/24/2017   CARLOS 7 TOTAL SCORE: 1  PHQ-2 Score: 1  General Symptoms: Yes  Skin Symptoms: No  HENT Symptoms: Yes  EYE SYMPTOMS: No  HEART SYMPTOMS: No  LUNG SYMPTOMS: No  INTESTINAL SYMPTOMS: No  URINARY SYMPTOMS: No  GYNECOLOGIC SYMPTOMS: No  BREAST SYMPTOMS: No  SKELETAL SYMPTOMS: Yes  BLOOD SYMPTOMS: No  NERVOUS SYSTEM SYMPTOMS: Yes  MENTAL HEALTH SYMPTOMS: No  Fever: No  Loss of appetite: No  Weight loss: No  Weight gain: No  Fatigue: Yes  Night sweats: No  Chills: No  Increased stress: No  Excessive hunger: No  Excessive thirst: No  Feeling hot or cold when others believe the temperature is normal: No  Loss of height: No  Post-operative complications: No  Surgical site pain: No  Hallucinations: No  Change in or Loss of Energy: No  Hyperactivity: No  Confusion: No  Ear pain: Yes--gone now  Ear discharge: No  Hearing loss: No  Tinnitus: Yes  Nosebleeds: No  Congestion: No  Sinus pain: No  Trouble swallowing: No   Voice hoarseness: No  Mouth sores: No  Sore throat: No  Tooth pain: No  Gum tenderness: No  Bleeding gums: No  Change in taste: No  Change in sense of smell: No  Dry mouth: No  Hearing aid used: No  Neck lump: No  Back pain: Yes  Muscle aches: Yes  Neck pain: Yes--recent and now resolved  Swollen joints: No  Joint pain: No  Bone pain: No  Muscle cramps: No  Muscle weakness: No  Joint stiffness: No  Bone fracture: No  Trouble with coordination: No  Dizziness or trouble with balance: Yes--lighheadedness, change in position, bending and standing, turning intermittent  Fainting or black-out spells: No  Memory loss: No  Headache: No  Seizures: No  Speech problems: No  Tingling: Yes----toe, resovled  Tremor: No  Weakness:  "No  Difficulty walking: No  Paralysis: No  Numbness: Yes--R arm falls asleep, position changes    PE  Blood pressure 131/86, pulse 64, height 1.613 m (5' 3.5\"), weight 93 kg (205 lb 1.6 oz), not currently breastfeeding.  Constitutional: Well appearing woman in no acute distress.   Psychological: appropriate mood.  Eyes: anicteric, normal extra-ocular movements,  pupils are equal and reactive to light.   Ears, Nose and Throat: tympanic membranes clear, nose clear and free of lesions, throat clear, moist mucous membrames, neck supple with full range of motion.    Neck: No thyroidmegaly. No jugular venous distension, no carotid bruits.  Cardiovascular: regular rate and rhythm, normal S1 and S2, no murmurs, rubs or gallops, peripheral pulses decreased on R, but foot warm and pink, full pulse on L.   Respiratory: clear to auscultation, no wheezes or crackles, normal breath sounds.  Breast: Symmetrical without visible distortion or swelling. No masses noted. No nipple inversion, no breast dimpling or puckering. Axillary area without masses or lympadenapathy.   Gastrointestinal: positive bowel sounds, nontender, no hepatosplenomegaly, no masses. No guarding or rebound.  Genitourinary: External genitalia is normal appearance. No enlargement of the [place] or [place] glands. Urethra and bladder are non-tender. Vagina is without lesions or discharge. Normal epithelium, no anterior or posterior wall defects. Cervix is smooth, without lesions, no cervical motion tenderness. Uterus is normal size, shape, and contour. Adnexa without tenderness or enlarged. Rectal exam with normal sphincter tone, no masses. Perineum without lesions.   Lymphatic: no lymphadenopathy.  Musculoskeletal: full range of motion, no edema and motor strength is equal in the upper and lower extremities    Skin: R forearm 4 mm patch mild hyperpigmentation ? Fine scale. Multiple areas sun damage on legs, arms, face; solar lentiges, no jaundice.  Neurological: " cranial nerves intact, normal strength and sensation, reflexes at patella and biceps normal, normal gait, no tremor.   Monofilament Foot Exam: n/a      A/P  1. HCM  Due for dtaP, up to date on cancer screening, mammogram in March 2018. Continue weight bearing exercise and adequate Vit D in diet and supplement (total 600-1000 units/day)  2. Skin lesion with other sun damage--refer to dermatology for evaluation  3. Depression in remission off medication--monitor  4. Lightheaded with position change--discussed potential causes, increase in orthostatic symtoms with age, monitor and re-evaluate if symptoms   5. Hypothyroid--no changes, repeat testing in March    F/u March

## 2017-12-31 ENCOUNTER — HEALTH MAINTENANCE LETTER (OUTPATIENT)
Age: 52
End: 2017-12-31

## 2018-06-28 ENCOUNTER — TELEPHONE (OUTPATIENT)
Dept: OBGYN | Facility: CLINIC | Age: 53
End: 2018-06-28

## 2018-06-28 DIAGNOSIS — Z23 NEED FOR DTAP VACCINE: ICD-10-CM

## 2018-06-28 DIAGNOSIS — E03.9 HYPOTHYROIDISM, UNSPECIFIED TYPE: ICD-10-CM

## 2018-06-28 DIAGNOSIS — L98.9 SKIN LESION: ICD-10-CM

## 2018-06-28 DIAGNOSIS — Z00.00 ENCOUNTER FOR ROUTINE ADULT HEALTH EXAMINATION WITHOUT ABNORMAL FINDINGS: ICD-10-CM

## 2018-06-28 RX ORDER — LEVOTHYROXINE SODIUM 137 UG/1
137 TABLET ORAL DAILY
Qty: 90 TABLET | Refills: 0 | Status: SHIPPED | OUTPATIENT
Start: 2018-06-28 | End: 2018-10-24

## 2018-06-28 NOTE — TELEPHONE ENCOUNTER
Received refill request for levothyroxine.  Last in clinic 7/2017. Informed Cassandra I will send refill to cover until seen in clinic for annual. She will call back to schedule when she looks at her calendar, aware due in July.Pt indicated understanding and agreed with plan.

## 2018-07-26 ASSESSMENT — ANXIETY QUESTIONNAIRES
2. NOT BEING ABLE TO STOP OR CONTROL WORRYING: SEVERAL DAYS
3. WORRYING TOO MUCH ABOUT DIFFERENT THINGS: NOT AT ALL
4. TROUBLE RELAXING: NOT AT ALL
7. FEELING AFRAID AS IF SOMETHING AWFUL MIGHT HAPPEN: SEVERAL DAYS
GAD7 TOTAL SCORE: 3
5. BEING SO RESTLESS THAT IT IS HARD TO SIT STILL: NOT AT ALL
7. FEELING AFRAID AS IF SOMETHING AWFUL MIGHT HAPPEN: SEVERAL DAYS
GAD7 TOTAL SCORE: 3
6. BECOMING EASILY ANNOYED OR IRRITABLE: SEVERAL DAYS
1. FEELING NERVOUS, ANXIOUS, OR ON EDGE: NOT AT ALL

## 2018-07-27 ENCOUNTER — OFFICE VISIT (OUTPATIENT)
Dept: FAMILY MEDICINE | Facility: CLINIC | Age: 53
End: 2018-07-27
Attending: FAMILY MEDICINE

## 2018-07-27 VITALS
DIASTOLIC BLOOD PRESSURE: 89 MMHG | HEART RATE: 69 BPM | BODY MASS INDEX: 34.93 KG/M2 | SYSTOLIC BLOOD PRESSURE: 135 MMHG | HEIGHT: 64 IN | WEIGHT: 204.6 LBS

## 2018-07-27 DIAGNOSIS — Z12.31 ENCOUNTER FOR SCREENING MAMMOGRAM FOR BREAST CANCER: Primary | ICD-10-CM

## 2018-07-27 DIAGNOSIS — E03.9 HYPOTHYROIDISM, UNSPECIFIED TYPE: ICD-10-CM

## 2018-07-27 PROCEDURE — G0463 HOSPITAL OUTPT CLINIC VISIT: HCPCS | Mod: ZF

## 2018-07-27 ASSESSMENT — ANXIETY QUESTIONNAIRES: GAD7 TOTAL SCORE: 3

## 2018-07-27 NOTE — MR AVS SNAPSHOT
"              After Visit Summary   7/27/2018    Cassandra Burgess    MRN: [id number]           Patient Information     Date Of Birth          1965        Visit Information        Provider Department      7/27/2018 9:40 AM Roland Narayan MD [facility]        Today's Diagnoses     Encounter for screening mammogram for breast cancer    -  1    Hypothyroidism, unspecified type           Follow-ups after your visit        Follow-up notes from your care team     Return in about 1 year (around 7/27/2019).      Who to contact     Please call your clinic at [phone] to:    Ask questions about your health    Make or cancel appointments    Discuss your medicines    Learn about your test results    Speak to your doctor            Additional Information About Your Visit        PrecipioharShoutlet Information     [email or web address] gives you secure access to your electronic health record. If you see a primary care provider, you can also send messages to your care team and make appointments. If you have questions, please call your primary care clinic.  If you do not have a primary care provider, please call [phone] and they will assist you.      [email or web address] is an electronic gateway that provides easy, online access to your medical records. With [email or web address], you can request a clinic appointment, read your test results, renew a prescription or communicate with your care team.     To access your existing account, please contact your [facility] or call [phone] for assistance.        Care EveryWhere ID     This is your Care EveryWhere ID. This could be used by other organizations to access your [facility] medical records  [id number]        Your Vitals Were     Pulse Height BMI (Body Mass Index)             69 1.613 m (5' 3.5\") 35.67 kg/m2          Blood Pressure from Last 3 Encounters:   07/27/18 135/89   07/26/17 131/86   03/22/17 119/84    Weight from Last 3 Encounters:   07/27/18 92.8 kg (204 " lb 9.6 oz)   07/26/17 93 kg (205 lb 1.6 oz)   03/22/17 90.7 kg (200 lb)               Primary Care Provider Office Phone # Fax #    Becky Gaspar [phone]       [address] [address]        Equal Access to Services     [facility] HospitalKILEY : Hadii aad ku hadasho Soomaali, waaxda luqadaha, qaybta kaalmada adeegyada, waxay idiin hayaan adeeg kharash lamarsha . So [facility] [phone].    ATENCIÓN: Si habla espcarmina, tiene a mabry disposición servicios gratuitos de asistencia lingüística. Llame al [phone].    We comply with applicable federal civil rights laws and [place] laws. We do not discriminate on the basis of race, color, national origin, age, disability, sex, sexual orientation, or gender identity.            Thank you!     Thank you for choosing [facility]  for your care. Our goal is always to provide you with excellent care. Hearing back from our patients is one way we can continue to improve our services. Please take a few minutes to complete the written survey that you may receive in the mail after your visit with us. Thank you!             Your Updated Medication List - Protect others around you: Learn how to safely use, store and throw away your medicines at www.disposemymeds.org.          This list is accurate as of 7/27/18 11:59 PM.  Always use your most recent med list.                   Brand Name Dispense Instructions for use Diagnosis    CALCIUM MAGNESIUM PO      Take by mouth daily        levothyroxine 137 MCG tablet    SYNTHROID/LEVOTHROID    90 tablet    Take 1 tablet (137 mcg) by mouth daily    Hypothyroidism, unspecified type, Encounter for routine adult health examination without abnormal findings, Skin lesion, Need for DTaP vaccine       MULTIVITAMIN PO      Take 4 capsules by mouth daily 50 and wiser women Raw whole food        OMEGA-3 FISH OIL PO           PROBIOTIC DAILY PO      Take by mouth daily         vitamin B complex with vitamin C Tabs tablet      Take 1 tablet by mouth daily        VITAMIN D3 PO      Take by mouth daily

## 2018-07-27 NOTE — LETTER
2018       RE: Cassandra Burgess  [address] [facility] [address]     Dear Colleague,    Thank you for referring your patient, Cassandra Burgess, to the [facility] at [facility]. Please see a copy of my visit note below.    Pt. Here for CPE      1. Hypothyroid--Continues on 137 mcg levothyroxine. Due for monitoring.  2 . Depression and anxiety--in remission, off medication, helped with Vit D and B complexes, magnesium.   3. Breast--no other concerns, due mammogram, last mammogram , all normal in past. Average risk  4. Gyne-- LMP-- approx, s/p hysterectomy for fibroids, ovaries left. Menses regular prior to that. Paps normal except right after childbearing, with normal colposcopy. No night sweats.. No vaginal symptoms. Some urinary leaking when cough/sneeze, tolerable. Signs of menopause in early 50's..  (twins) Vaginal with 1 breech (flipped). Near 7 and 8 #. (children age 25+ now).      (x 4 yrs,  x 25 years) Currently sexually active with male partner, monogamous, no condoms use.  Some orgasm difficulties, but not problematic, good communication.   No hx STI ( did have herpes). Started hep B vaccine 1 out of 3.   Hep C negative.  Hx of working in special education, with some body fluid exposures, now only substitute teaching.   5. Bone--eat dairy, some. Calcium supplement irregular. Vit D supplement: taking, unknown dose. Activity: walking 10,000 steps/day. Use sunlamp in winter.    6. HCM-- last recorded, dtaP 2017, colonsocopy normal , lipids 2017  HDL 72, glucose?         PMH  Hysterectomy          Family History                Family History   Problem Relation Age of Onset     Diabetes Maternal Grandmother        Depression Sister        Depression Sister        Multiple Sclerosis Brother        Breast Cancer Niece        Anxiety Disorder Daughter        Depression Sister        Depression Sister        Genetic  "Disorder Nephew            cystic fibrosis     Thyroid Disease Sister              Mother--well  Father--d. Lung cancer  Mat grandfather--bipolar, mat uncle bipolar (both suicide)  No changes since 2017     SH  Standard diet  ETOH--1/week, 1/sitting  No tobacco  Activity above    Reviewed with patient    PE  Blood pressure 135/89, pulse 69, height 1.613 m (5' 3.5\"), weight 92.8 kg (204 lb 9.6 oz), not currently breastfeeding.  Constitutional: Well appearing woman in no acute distress.   Psychological: appropriate mood.  Eyes: anicteric, normal extra-ocular movements,  pupils are equal and reactive to light.   Ears, Nose and Throat: tympanic membranes clear, nose clear and free of lesions, throat clear, moist mucous membrames, neck supple with full range of motion.    Neck: No thyroidmegaly. No jugular venous distension, no carotid bruits.  Cardiovascular: regular rate and rhythm, normal S1 and S2, no murmurs, rubs or gallops, peripheral pulses mildly decreased but symmetric, feet warm and pink  Respiratory: clear to auscultation, no wheezes or crackles, normal breath sounds.  Breast: Breast and  exam not indicated for this patient according to AAFP/ USPSTF clinical guidelines.  Gastrointestinal: positive bowel sounds, nontender, no hepatosplenomegaly, no masses. No guarding or rebound.  Lymphatic: no lymphadenopathy.  Musculoskeletal: full range of motion, no edema and motor strength is equal in the upper and lower extremities    Skin: no concerning lesions, no jaundice.  Neurological: cranial nerves intact, normal strength and sensation, reflexes at patella and biceps normal, normal gait, no tremor.   Monofilament Foot Exam: n/a    A/P  1. HCM--reviewed mammogram screenining guidelines; pt. Is in between insurance. Will place order and she will complete when insurance in place. Counseled patient regarding risk factors for osteoporosis which include limited dairy, hypothyroid and now post menopause. Recommend DEXA " at age 60, keep euthyroid and Vit D supplement 600-1000 units daily. Consider complete Hep B vaccination  Otherwise up to date immunization, cancer screening and CV risk screening    2. Hypothyroid--check TSH, free T4  3. Elevated BP---check BP outside office x 5-6, write down values send via  [email or web address],  Goal of 130/80 or less      F/u 1 year    Again, thank you for allowing me to participate in the care of your patient.      Sincerely,    Roland Narayan MD

## 2018-07-27 NOTE — PROGRESS NOTES
Pt. Here for CPE      1. Hypothyroid--Continues on 137 mcg levothyroxine. Due for monitoring.  2 . Depression and anxiety--in remission, off medication, helped with Vit D and B complexes, magnesium.   3. Breast--no other concerns, due mammogram, last mammogram , all normal in past. Average risk  4. Gyne-- LMP-- approx, s/p hysterectomy for fibroids, ovaries left. Menses regular prior to that. Paps normal except right after childbearing, with normal colposcopy. No night sweats.. No vaginal symptoms. Some urinary leaking when cough/sneeze, tolerable. Signs of menopause in early 50's..  (twins) Vaginal with 1 breech (flipped). Near 7 and 8 #. (children age 25+ now).      (x 4 yrs,  x 25 years) Currently sexually active with male partner, monogamous, no condoms use.  Some orgasm difficulties, but not problematic, good communication.   No hx STI ( did have herpes). Started hep B vaccine 1 out of 3.   Hep C negative.  Hx of working in special education, with some body fluid exposures, now only substitute teaching.   5. Bone--eat dairy, some. Calcium supplement irregular. Vit D supplement: taking, unknown dose. Activity: walking 10,000 steps/day. Use sunlamp in winter.    6. HCM-- last recorded, dtaP , colonsocopy normal , lipids 2017  HDL 72, glucose?         PMH  Hysterectomy          Family History                Family History   Problem Relation Age of Onset     Diabetes Maternal Grandmother        Depression Sister        Depression Sister        Multiple Sclerosis Brother        Breast Cancer Niece        Anxiety Disorder Daughter        Depression Sister        Depression Sister        Genetic Disorder Nephew            cystic fibrosis     Thyroid Disease Sister              Mother--well  Father--d. Lung cancer  Mat grandfather--bipolar, mat uncle bipolar (both suicide)  No changes since      SH  Standard diet  ETOH--1/week, 1/sitting  No tobacco  Activity  "above    Answers for HPI/ROS submitted by the patient on 7/26/2018   CARLOS 7 TOTAL SCORE: 3  PHQ-2 Score: 0  General Symptoms: No  Skin Symptoms: No  HENT Symptoms: No  EYE SYMPTOMS: No  HEART SYMPTOMS: No  LUNG SYMPTOMS: No  INTESTINAL SYMPTOMS: No  URINARY SYMPTOMS: No  GYNECOLOGIC SYMPTOMS: No  BREAST SYMPTOMS: No  SKELETAL SYMPTOMS: No  BLOOD SYMPTOMS: No  NERVOUS SYSTEM SYMPTOMS: No  MENTAL HEALTH SYMPTOMS: No  Reviewed with patient    PE  Blood pressure 135/89, pulse 69, height 1.613 m (5' 3.5\"), weight 92.8 kg (204 lb 9.6 oz), not currently breastfeeding.  Constitutional: Well appearing woman in no acute distress.   Psychological: appropriate mood.  Eyes: anicteric, normal extra-ocular movements,  pupils are equal and reactive to light.   Ears, Nose and Throat: tympanic membranes clear, nose clear and free of lesions, throat clear, moist mucous membrames, neck supple with full range of motion.    Neck: No thyroidmegaly. No jugular venous distension, no carotid bruits.  Cardiovascular: regular rate and rhythm, normal S1 and S2, no murmurs, rubs or gallops, peripheral pulses mildly decreased but symmetric, feet warm and pink  Respiratory: clear to auscultation, no wheezes or crackles, normal breath sounds.  Breast: Breast and  exam not indicated for this patient according to AAFP/ USPSTF clinical guidelines.  Gastrointestinal: positive bowel sounds, nontender, no hepatosplenomegaly, no masses. No guarding or rebound.  Lymphatic: no lymphadenopathy.  Musculoskeletal: full range of motion, no edema and motor strength is equal in the upper and lower extremities    Skin: no concerning lesions, no jaundice.  Neurological: cranial nerves intact, normal strength and sensation, reflexes at patella and biceps normal, normal gait, no tremor.   Monofilament Foot Exam: n/a    A/P  1. HCM--reviewed mammogram screenining guidelines; pt. Is in between insurance. Will place order and she will complete when insurance in place. " Counseled patient regarding risk factors for osteoporosis which include limited dairy, hypothyroid and now post menopause. Recommend DEXA at age 60, keep euthyroid and Vit D supplement 600-1000 units daily. Consider complete Hep B vaccination  Otherwise up to date immunization, cancer screening and CV risk screening    2. Hypothyroid--check TSH, free T4  3. Elevated BP---check BP outside office x 5-6, write down values send via  [employer],  Goal of 130/80 or less      F/u 1 year

## 2018-10-18 ENCOUNTER — MYC MEDICAL ADVICE (OUTPATIENT)
Dept: FAMILY MEDICINE | Facility: CLINIC | Age: 53
End: 2018-10-18

## 2018-10-18 DIAGNOSIS — I10 BENIGN ESSENTIAL HYPERTENSION: Primary | ICD-10-CM

## 2018-10-19 NOTE — ADDENDUM NOTE
Addended by: SYLVIA LEWIS on: 10/19/2018 02:21 PM     Modules accepted: Orders     Spoke with Eloy Marr in regards to patient's plan of care for [facility]. Advised that per Dr. Carnella Rubinstein he will sign her plan of care if faxed to us.

## 2018-10-20 DIAGNOSIS — I10 BENIGN ESSENTIAL HYPERTENSION: ICD-10-CM

## 2018-10-20 DIAGNOSIS — E03.9 HYPOTHYROIDISM, UNSPECIFIED TYPE: ICD-10-CM

## 2018-10-20 LAB
ANION GAP SERPL CALCULATED.3IONS-SCNC: 6 MMOL/L (ref 3–14)
BUN SERPL-MCNC: 18 MG/DL (ref 7–30)
CALCIUM SERPL-MCNC: 8.8 MG/DL (ref 8.5–10.1)
CHLORIDE SERPL-SCNC: 104 MMOL/L (ref 94–109)
CO2 SERPL-SCNC: 29 MMOL/L (ref 20–32)
CREAT SERPL-MCNC: 0.83 MG/DL (ref 0.52–1.04)
GFR SERPL CREATININE-BSD FRML MDRD: 71 ML/MIN/1.7M2
GLUCOSE SERPL-MCNC: 80 MG/DL (ref 70–99)
POTASSIUM SERPL-SCNC: 4.3 MMOL/L (ref 3.4–5.3)
SODIUM SERPL-SCNC: 139 MMOL/L (ref 133–144)
T4 FREE SERPL-MCNC: 1.08 NG/DL (ref 0.76–1.46)
TSH SERPL DL<=0.005 MIU/L-ACNC: 2.52 MU/L (ref 0.4–4)

## 2018-10-20 PROCEDURE — 80048 BASIC METABOLIC PNL TOTAL CA: CPT | Performed by: FAMILY MEDICINE

## 2018-10-20 PROCEDURE — 84443 ASSAY THYROID STIM HORMONE: CPT | Performed by: FAMILY MEDICINE

## 2018-10-20 PROCEDURE — 84439 ASSAY OF FREE THYROXINE: CPT | Performed by: FAMILY MEDICINE

## 2018-10-20 PROCEDURE — 36415 COLL VENOUS BLD VENIPUNCTURE: CPT | Performed by: FAMILY MEDICINE

## 2018-10-24 ENCOUNTER — OFFICE VISIT (OUTPATIENT)
Dept: FAMILY MEDICINE | Facility: CLINIC | Age: 53
End: 2018-10-24
Attending: FAMILY MEDICINE
Payer: COMMERCIAL

## 2018-10-24 VITALS
WEIGHT: 213 LBS | SYSTOLIC BLOOD PRESSURE: 143 MMHG | HEART RATE: 71 BPM | BODY MASS INDEX: 37.14 KG/M2 | DIASTOLIC BLOOD PRESSURE: 94 MMHG

## 2018-10-24 DIAGNOSIS — I10 BENIGN ESSENTIAL HYPERTENSION: Primary | ICD-10-CM

## 2018-10-24 DIAGNOSIS — E03.9 HYPOTHYROIDISM, UNSPECIFIED TYPE: ICD-10-CM

## 2018-10-24 DIAGNOSIS — Z23 NEED FOR DTAP VACCINE: ICD-10-CM

## 2018-10-24 DIAGNOSIS — Z00.00 ENCOUNTER FOR ROUTINE ADULT HEALTH EXAMINATION WITHOUT ABNORMAL FINDINGS: ICD-10-CM

## 2018-10-24 DIAGNOSIS — L98.9 SKIN LESION: ICD-10-CM

## 2018-10-24 RX ORDER — LISINOPRIL 10 MG/1
10 TABLET ORAL DAILY
Qty: 90 TABLET | Refills: 0 | Status: SHIPPED | OUTPATIENT
Start: 2018-10-24 | End: 2019-01-25

## 2018-10-24 RX ORDER — LEVOTHYROXINE SODIUM 137 UG/1
137 TABLET ORAL DAILY
Qty: 90 TABLET | Refills: 1 | Status: SHIPPED | OUTPATIENT
Start: 2018-10-24 | End: 2019-04-26

## 2018-10-24 ASSESSMENT — ANXIETY QUESTIONNAIRES
7. FEELING AFRAID AS IF SOMETHING AWFUL MIGHT HAPPEN: NOT AT ALL
3. WORRYING TOO MUCH ABOUT DIFFERENT THINGS: NOT AT ALL
5. BEING SO RESTLESS THAT IT IS HARD TO SIT STILL: SEVERAL DAYS
GAD7 TOTAL SCORE: 3
1. FEELING NERVOUS, ANXIOUS, OR ON EDGE: NOT AT ALL
7. FEELING AFRAID AS IF SOMETHING AWFUL MIGHT HAPPEN: NOT AT ALL
GAD7 TOTAL SCORE: 3
2. NOT BEING ABLE TO STOP OR CONTROL WORRYING: NOT AT ALL
4. TROUBLE RELAXING: SEVERAL DAYS
6. BECOMING EASILY ANNOYED OR IRRITABLE: SEVERAL DAYS

## 2018-10-24 ASSESSMENT — ENCOUNTER SYMPTOMS
SHORTNESS OF BREATH: 0
LEG SWELLING: 0
COUGH: 0

## 2018-10-24 ASSESSMENT — PAIN SCALES - GENERAL: PAINLEVEL: NO PAIN (0)

## 2018-10-24 NOTE — LETTER
10/24/2018       RE: Cassandra Burgess  [address] [facility] [address]     Dear Colleague,    Thank you for referring your patient, Cassandra Burgess, to the [facility] at [facility]. Please see a copy of my visit note below.    HPI  Pt. Here for f/u HTN  Multiple bp readings at home indicate HTN--reviewed with patient  Here to discuss and start medications    Physical Exam   Constitutional: She is well-developed, well-nourished, and in no distress.   Cardiovascular: Normal rate, regular rhythm and normal heart sounds.    Pulmonary/Chest: Effort normal and breath sounds normal.     Blood pressure (!) 143/94, pulse 71, weight 96.6 kg (213 lb), not currently breastfeeding.    A/P  1. Essential HTN  Counseled patient on the nature and treatment options for HTN, including medication options and their potential side effects  Will start Lisinopril 10 mg daily  To check bp outside office intermittently      F/u 3 months         Again, thank you for allowing me to participate in the care of your patient.      Sincerely,    Roland Narayan MD

## 2018-10-24 NOTE — MR AVS SNAPSHOT
After Visit Summary   10/24/2018    Cassandra Burgess    MRN: [id number]           Patient Information     Date Of Birth          1965        Visit Information        Provider Department      10/24/2018 2:20 PM Roland Narayan MD [facility]        Today's Diagnoses     Benign essential hypertension    -  1    Hypothyroidism, unspecified type        Encounter for routine adult health examination without abnormal findings        Skin lesion        Need for DTaP vaccine           Follow-ups after your visit        Follow-up notes from your care team     Return in about 3 months (around 1/24/2019).      Your next 10 appointments already scheduled     Nov 19, 2018 10:15 AM CST   New Patient Visit with Krissy Uribe MD   [facility] ([facility])    [address]  [address]  [place] [facility] [id number] [phone]            Jan 25, 2019 10:00 AM CST   Return Visit with Roland Narayan MD   [facility] ([facility])    [facility] [address]  [address]rd [address]  [address]  [address] [address]   [phone]              Who to contact     Please call your clinic at [phone] to:    Ask questions about your health    Make or cancel appointments    Discuss your medicines    Learn about your test results    Speak to your doctor            Additional Information About Your Visit        [email or web address] Information     [employer] gives you secure access to your electronic health record. If you see a primary care provider, you can also send messages to your care team and make appointments. If you have questions, please call your primary care clinic.  If you do not have a primary care provider, please call [phone] and they will assist you.      [employer] is an electronic gateway that provides easy, online access to your medical records. With [employer], you can request a clinic appointment,  read your test results, renew a prescription or communicate with your care team.     To access your existing account, please contact your [facility] or call [phone] for assistance.        Care EveryWhere ID     This is your Care EveryWhere ID. This could be used by other organizations to access your [place] medical records  [id number]        Your Vitals Were     Pulse Breastfeeding? BMI (Body Mass Index)             71 No 37.14 kg/m2          Blood Pressure from Last 3 Encounters:   10/24/18 (!) 143/94   07/27/18 135/89   07/26/17 131/86    Weight from Last 3 Encounters:   10/24/18 96.6 kg (213 lb)   07/27/18 92.8 kg (204 lb 9.6 oz)   07/26/17 93 kg (205 lb 1.6 oz)              Today, you had the following     No orders found for display         Today's Medication Changes          These changes are accurate as of 10/24/18 11:59 PM.  If you have any questions, ask your nurse or doctor.               Start taking these medicines.        Dose/Directions    lisinopril 10 MG tablet   Commonly known as:  PRINIVIL/ZESTRIL   Used for:  Benign essential hypertension   Started by:  Roland Narayan MD        Dose:  10 mg   Take 1 tablet (10 mg) by mouth daily   Quantity:  90 tablet   Refills:  0            Where to get your medicines      These medications were sent to [place] [address] [place] [id number]    Hours:  M-F 9-7 SAT 9-6 SUN 11-3 Phone:  [phone]     levothyroxine 137 MCG tablet    lisinopril 10 MG tablet                Primary Care Provider Office Phone # Fax #    Becky Gaspar [phone]       [facility] [address] [place] [address]        Equal Access to Services     SERINA MENDEZ : Wendy Ray, calvin luz, scott crockett. So [facility] [phone].    ATENCIÓN: Jimmie box  español, tiene a mabry disposición servicios gratuitos de asistencia lingüística. Michael hurst [phone].    We comply with applicable federal civil rights laws and [place] laws. We do not discriminate on the basis of race, color, national origin, age, disability, sex, sexual orientation, or gender identity.            Thank you!     Thank you for choosing [facility]  for your care. Our goal is always to provide you with excellent care. Hearing back from our patients is one way we can continue to improve our services. Please take a few minutes to complete the written survey that you may receive in the mail after your visit with us. Thank you!             Your Updated Medication List - Protect others around you: Learn how to safely use, store and throw away your medicines at www.disposemymeds.org.          This list is accurate as of 10/24/18 11:59 PM.  Always use your most recent med list.                   Brand Name Dispense Instructions for use Diagnosis    CALCIUM MAGNESIUM PO      Take by mouth daily        levothyroxine 137 MCG tablet    SYNTHROID/LEVOTHROID    90 tablet    Take 1 tablet (137 mcg) by mouth daily    Hypothyroidism, unspecified type, Encounter for routine adult health examination without abnormal findings, Skin lesion, Need for DTaP vaccine       lisinopril 10 MG tablet    PRINIVIL/ZESTRIL    90 tablet    Take 1 tablet (10 mg) by mouth daily    Benign essential hypertension       MULTIVITAMIN PO      Take 4 capsules by mouth daily 50 and wiser women Raw whole food        OMEGA-3 FISH OIL PO           PROBIOTIC DAILY PO      Take by mouth daily        vitamin B complex with vitamin C Tabs tablet      Take 1 tablet by mouth daily        VITAMIN D3 PO      Take by mouth daily

## 2018-10-24 NOTE — PROGRESS NOTES
HPI  Pt. Here for f/u HTN  Multiple bp readings at home indicate HTN--reviewed with patient  Here to discuss and start medications      Review of Systems     Respiratory:   Negative for cough and shortness of breath.    Cardiovascular:  Negative for chest pain and leg swelling.     Answers for HPI/ROS submitted by the patient on 10/24/2018   CARLOS 7 TOTAL SCORE: 3  PHQ-2 Score: 0      Physical Exam   Constitutional: She is well-developed, well-nourished, and in no distress.   Cardiovascular: Normal rate, regular rhythm and normal heart sounds.    Pulmonary/Chest: Effort normal and breath sounds normal.     Blood pressure (!) 143/94, pulse 71, weight 96.6 kg (213 lb), not currently breastfeeding.    A/P  1. Essential HTN  Counseled patient on the nature and treatment options for HTN, including medication options and their potential side effects  Will start Lisinopril 10 mg daily  To check bp outside office intermittently      F/u 3 months

## 2018-10-25 ASSESSMENT — ANXIETY QUESTIONNAIRES: GAD7 TOTAL SCORE: 3

## 2018-11-13 PROBLEM — I10 BENIGN ESSENTIAL HYPERTENSION: Status: ACTIVE | Noted: 2018-11-13

## 2018-11-19 ENCOUNTER — OFFICE VISIT (OUTPATIENT)
Dept: DERMATOLOGY | Facility: CLINIC | Age: 53
End: 2018-11-19
Attending: DERMATOLOGY
Payer: COMMERCIAL

## 2018-11-19 VITALS
DIASTOLIC BLOOD PRESSURE: 86 MMHG | BODY MASS INDEX: 37.56 KG/M2 | HEART RATE: 62 BPM | SYSTOLIC BLOOD PRESSURE: 134 MMHG | WEIGHT: 212 LBS | HEIGHT: 63 IN

## 2018-11-19 DIAGNOSIS — L81.4 SOLAR LENTIGINOSIS: ICD-10-CM

## 2018-11-19 DIAGNOSIS — D18.01 CHERRY ANGIOMA: ICD-10-CM

## 2018-11-19 DIAGNOSIS — D48.5 NEOPLASM OF UNCERTAIN BEHAVIOR OF SKIN: Primary | ICD-10-CM

## 2018-11-19 DIAGNOSIS — L82.1 SK (SEBORRHEIC KERATOSIS): ICD-10-CM

## 2018-11-19 PROCEDURE — 11100 HC BIOPSY SKIN/SUBQ/MUC MEM, SINGLE LESION: CPT | Mod: ZF | Performed by: DERMATOLOGY

## 2018-11-19 PROCEDURE — G0463 HOSPITAL OUTPT CLINIC VISIT: HCPCS | Mod: ZF

## 2018-11-19 PROCEDURE — 88305 TISSUE EXAM BY PATHOLOGIST: CPT | Mod: TC | Performed by: DERMATOLOGY

## 2018-11-19 PROCEDURE — 11101 HC BIOPSY SKIN/SUBQ/MUC MEM, EACH ADDTL LESION: CPT | Mod: ZF | Performed by: DERMATOLOGY

## 2018-11-19 RX ORDER — LIDOCAINE HYDROCHLORIDE AND EPINEPHRINE 10; 10 MG/ML; UG/ML
3 INJECTION, SOLUTION INFILTRATION; PERINEURAL ONCE
Qty: 3 ML | Refills: 0 | OUTPATIENT
Start: 2018-11-19 | End: 2019-03-23

## 2018-11-19 ASSESSMENT — PAIN SCALES - GENERAL: PAINLEVEL: MODERATE PAIN (4)

## 2018-11-19 NOTE — LETTER
11/19/2018      RE: Cassandra Burgess  [address] [facility] [address]               Dermatology Clinic Note    Dermatology Problem List:  1. Solar lentigines  2. Seborrheic keratoses  3. Cherry angiomas  4. Neoplasm of uncertain behavior x2, L upper chest, central upper back       CC: Patient presents with:  Establish Care: Skin check      HPI: Cassandra Burgess is a 53 year old female presenting for initial evaluation of moles. New and self-referred. Notes a pink bump on the upper chest, bleeds when picked, and scattered bumps on the back. Has scaling lesions on the lower legs that get irritated. No history of skin cancer or dysplastic nevi, but does have history of many sunburns.       Patient Active Problem List   Diagnosis     Depression with anxiety     Acquired hypothyroidism     Menopausal syndrome (hot flashes)     S/P complete hysterectomy     Obstructive sleep apnea syndrome     Benign essential hypertension     SK (seborrheic keratosis)     Cherry angioma     Solar lentiginosis       No Known Allergies      Current Outpatient Prescriptions   Medication     Calcium-Magnesium-Vitamin D (CALCIUM MAGNESIUM PO)     Cholecalciferol (VITAMIN D3 PO)     levothyroxine (SYNTHROID/LEVOTHROID) 137 MCG tablet     lidocaine 1% with EPINEPHrine 1:100,000 1 %-1:156738 injection     lisinopril (PRINIVIL/ZESTRIL) 10 MG tablet     Multiple Vitamins-Minerals (MULTIVITAMIN PO)     Omega-3 Fatty Acids (OMEGA-3 FISH OIL PO)     Probiotic Product (PROBIOTIC DAILY PO)     vitamin  B complex with vitamin C (VITAMIN  B COMPLEX) TABS     No current facility-administered medications for this visit.        Family History   Problem Relation Age of Onset     Diabetes Maternal Grandmother      Depression Sister      Depression Sister      Multiple Sclerosis Brother      Breast Cancer Niece      Anxiety Disorder Daughter      Depression Sister      Depression Sister      Genetic Disorder Nephew      cystic fibrosis     Thyroid Disease Sister   "    Breast Cancer Niece      currently cancer free       Social History     Social History     Marital status:      Spouse name: N/A     Number of children: N/A     Years of education: N/A     Occupational History      SLD/EBD      Social History Main Topics     Smoking status: Never Smoker     Smokeless tobacco: Never Used     Alcohol use 0.0 oz/week      Comment: very little     Drug use: No     Sexual activity: Yes     Partners: Male     Birth control/ protection: Female Surgical     Other Topics Concern     Not on file     Social History Narrative         ROS: Feeling well without other skin concerns.     EXAM:  /86  Pulse 62  Ht 5' 3\" (160 cm)  Wt 212 lb (96.2 kg)  Breastfeeding? No  BMI 37.55 kg/m2  GEN: Alert, no distress  HEENT: Conjunctiva clear.   PULM: Breathing comfortably on RA  CV: Extrem warm and well perfused  ABD: No distension  SKIN: Exam of the face, neck, chest, abdomen, back, arms, legs, hands, feet, buttocks. Normal except as follows:    --light brown macules extensively on the upper chest, shoulders, lateral cheeks, upper back, dorsal arms, legs  --Friable pink 3 mm papule on the Lupper chest  --Friable pink scaling papule on the central upper back  --Scattered flat topped waxy papules on the lower legs  --Firm pink 4 mm papule on the L medial calf with positive dimple sign    Shave biopsy:  After discussion of benefits and risks including but not limited to bleeding/bruising, pain/swelling, infection, scar, incomplete removal, nerve damage/numbness, recurrence, and non-diagnostic biopsy, written consent, verbal consent and photographs were obtained. Time-out was performed. The area was cleaned with isopropyl alcohol. 1 mL of 1% lidocaine with epinephrine was injected to obtain adequate anesthesia of the lesion on the Central upper back and L upper chest. Two shave biopsies performed. Hemostasis was achieved with aluminium chloride. Vaseline and a " sterile dressing were applied. The patient tolerated the procedure and no complications were noted. The patient was provided with verbal and written post care instructions.         Assessment and Plan:    1. Neoplasm of uncertain behavior of skin  Concern for basal cell carcinoma x2. Will contact with results when available  - Dermatological path order and indications  - BIOPSY SKIN/SUBQ/MUC MEM, SINGLE LESION  - lidocaine 1% with EPINEPHrine 1:100,000 1 %-1:826146 injection; Inject 3 mLs into the skin once for 1 dose  Dispense: 3 mL; Refill: 0  - BIOPSY SKIN/SUBQ/MUC MEM, EACH ADDTL LESION    2. SK (seborrheic keratosis)  Seborrheic keratoses: Benign hyperkeratotic papules and plaques. No treatment advised unless irritation occurs.      3. Cherry angioma  Cherry angiomas: Benign vascular papules, often familial. No treatment advised.         4. Solar lentiginosis  Solar lentigines: Marker of past sun injury. Ongoing sun protection recommended.       5. Dermatofibroma on the L leg: Benign collagenous collection.     RTC yearly sooner pending biopsy.       Thank you for involving me in this patient's care.     Krissy Uribe MD  Dermatology Staff    CC: Referred Self, MD  No address on file    CLEMENTINA JESUS MD

## 2018-11-19 NOTE — MR AVS SNAPSHOT
After Visit Summary   11/19/2018    Cassandra Burgess    MRN: [id number]           Patient Information     Date Of Birth          1965        Visit Information        Provider Department      11/19/2018 10:15 AM Krissy Uribe MD [facility]        Today's Diagnoses     Neoplasm of uncertain behavior of skin    -  1    SK (seborrheic keratosis)        Cherry angioma        Solar lentiginosis           Follow-ups after your visit        Your next 10 appointments already scheduled     Jan 25, 2019 10:00 AM CST   Return Visit with Roland aNrayan MD   [facility] ([facility])    [facility]  [address]  [address]  [address] [address]   [phone]              Who to contact     Please call your clinic at [phone] to:    Ask questions about your health    Make or cancel appointments    Discuss your medicines    Learn about your test results    Speak to your doctor            Additional Information About Your Visit        MyChart Information     [employer] gives you secure access to your electronic health record. If you see a primary care provider, you can also send messages to your care team and make appointments. If you have questions, please call your primary care clinic.  If you do not have a primary care provider, please call [phone] and they will assist you.      [employer] is an electronic gateway that provides easy, online access to your medical records. With [employer], you can request a clinic appointment, read your test results, renew a prescription or communicate with your care team.     To access your existing account, please contact your [facility] or call [phone] for assistance.        Care EveryWhere ID     This is your Care EveryWhere ID. This could be used by other organizations to access your [place] medical records  [id number]        Your Vitals Were      "Pulse Height Breastfeeding? BMI (Body Mass Index)          62 5' 3\" (160 cm) No 37.55 kg/m2         Blood Pressure from Last 3 Encounters:   11/19/18 134/86   10/24/18 (!) 143/94   07/27/18 135/89    Weight from Last 3 Encounters:   11/19/18 212 lb (96.2 kg)   10/24/18 213 lb (96.6 kg)   07/27/18 204 lb 9.6 oz (92.8 kg)              We Performed the Following     BIOPSY SKIN/SUBQ/MUC MEM, EACH ADDTL LESION     BIOPSY SKIN/SUBQ/MUC MEM, SINGLE LESION     Dermatological path order and indications          Today's Medication Changes          These changes are accurate as of 11/19/18 11:15 AM.  If you have any questions, ask your nurse or doctor.               Start taking these medicines.        Dose/Directions    lidocaine 1% with EPINEPHrine 1:100,000 1 %-1:161904 injection   Used for:  Neoplasm of uncertain behavior of skin   Started by:  Krissy Uribe MD        Dose:  3 mL   Inject 3 mLs into the skin once for 1 dose   Quantity:  3 mL   Refills:  0            Where to get your medicines      Some of these will need a paper prescription and others can be bought over the counter.  Ask your nurse if you have questions.     You don't need a prescription for these medications     lidocaine 1% with EPINEPHrine 1:100,000 1 %-1:211610 injection                Primary Care Provider Office Phone # Fax #    Becky Gaspar [phone]       [address] [address]        Equal Access to Services     SERINA MENDEZ AH: Hadii aad ku hadasho Soomaali, waaxda luqadaha, qaybta kaalmada adeegyada, waxay idiin hayrobertn jennifer au . So [facility] [phone].    ATENCIÓN: Si habla español, tiene a mabry disposición servicios gratuitos de asistencia lingüística. Llame al [phone].    We comply with applicable federal civil rights laws and [place] laws. We do not discriminate on the basis of race, color, national origin, age, disability, sex, sexual " orientation, or gender identity.            Thank you!     Thank you for choosing [facility]  for your care. Our goal is always to provide you with excellent care. Hearing back from our patients is one way we can continue to improve our services. Please take a few minutes to complete the written survey that you may receive in the mail after your visit with us. Thank you!             Your Updated Medication List - Protect others around you: Learn how to safely use, store and throw away your medicines at www.disposemymeds.org.          This list is accurate as of 11/19/18 11:15 AM.  Always use your most recent med list.                   Brand Name Dispense Instructions for use Diagnosis    CALCIUM MAGNESIUM PO      Take by mouth daily        levothyroxine 137 MCG tablet    SYNTHROID/LEVOTHROID    90 tablet    Take 1 tablet (137 mcg) by mouth daily    Hypothyroidism, unspecified type, Encounter for routine adult health examination without abnormal findings, Skin lesion, Need for DTaP vaccine       lidocaine 1% with EPINEPHrine 1:100,000 1 %-1:106128 injection     3 mL    Inject 3 mLs into the skin once for 1 dose    Neoplasm of uncertain behavior of skin       lisinopril 10 MG tablet    PRINIVIL/ZESTRIL    90 tablet    Take 1 tablet (10 mg) by mouth daily    Benign essential hypertension       MULTIVITAMIN PO      Take 4 capsules by mouth daily 50 and wiser women Raw whole food        OMEGA-3 FISH OIL PO           PROBIOTIC DAILY PO      Take by mouth daily        vitamin B complex with vitamin C Tabs tablet      Take 1 tablet by mouth daily        VITAMIN D3 PO      Take by mouth daily

## 2018-11-19 NOTE — PROGRESS NOTES
Dermatology Clinic Note    Dermatology Problem List:  1. Solar lentigines  2. Seborrheic keratoses  3. Cherry angiomas  4. Neoplasm of uncertain behavior x2, L upper chest, central upper back       CC: Patient presents with:  Establish Care: Skin check      HPI: Cassandra Burgess is a 53 year old female presenting for initial evaluation of moles. New and self-referred. Notes a pink bump on the upper chest, bleeds when picked, and scattered bumps on the back. Has scaling lesions on the lower legs that get irritated. No history of skin cancer or dysplastic nevi, but does have history of many sunburns.       Patient Active Problem List   Diagnosis     Depression with anxiety     Acquired hypothyroidism     Menopausal syndrome (hot flashes)     S/P complete hysterectomy     Obstructive sleep apnea syndrome     Benign essential hypertension     SK (seborrheic keratosis)     Cherry angioma     Solar lentiginosis       No Known Allergies      Current Outpatient Prescriptions   Medication     Calcium-Magnesium-Vitamin D (CALCIUM MAGNESIUM PO)     Cholecalciferol (VITAMIN D3 PO)     levothyroxine (SYNTHROID/LEVOTHROID) 137 MCG tablet     lidocaine 1% with EPINEPHrine 1:100,000 1 %-1:529453 injection     lisinopril (PRINIVIL/ZESTRIL) 10 MG tablet     Multiple Vitamins-Minerals (MULTIVITAMIN PO)     Omega-3 Fatty Acids (OMEGA-3 FISH OIL PO)     Probiotic Product (PROBIOTIC DAILY PO)     vitamin  B complex with vitamin C (VITAMIN  B COMPLEX) TABS     No current facility-administered medications for this visit.        Family History   Problem Relation Age of Onset     Diabetes Maternal Grandmother      Depression Sister      Depression Sister      Multiple Sclerosis Brother      Breast Cancer Niece      Anxiety Disorder Daughter      Depression Sister      Depression Sister      Genetic Disorder Nephew      cystic fibrosis     Thyroid Disease Sister      Breast Cancer Niece      currently cancer free       Social History  "    Social History     Marital status:      Spouse name: N/A     Number of children: N/A     Years of education: N/A     Occupational History      SLD/EBD      Social History Main Topics     Smoking status: Never Smoker     Smokeless tobacco: Never Used     Alcohol use 0.0 oz/week      Comment: very little     Drug use: No     Sexual activity: Yes     Partners: Male     Birth control/ protection: Female Surgical     Other Topics Concern     Not on file     Social History Narrative         ROS: Feeling well without other skin concerns.     EXAM:  /86  Pulse 62  Ht 5' 3\" (160 cm)  Wt 212 lb (96.2 kg)  Breastfeeding? No  BMI 37.55 kg/m2  GEN: Alert, no distress  HEENT: Conjunctiva clear.   PULM: Breathing comfortably on RA  CV: Extrem warm and well perfused  ABD: No distension  SKIN: Exam of the face, neck, chest, abdomen, back, arms, legs, hands, feet, buttocks. Normal except as follows:    --light brown macules extensively on the upper chest, shoulders, lateral cheeks, upper back, dorsal arms, legs  --Friable pink 3 mm papule on the Lupper chest  --Friable pink scaling papule on the central upper back  --Scattered flat topped waxy papules on the lower legs  --Firm pink 4 mm papule on the L medial calf with positive dimple sign    Shave biopsy:  After discussion of benefits and risks including but not limited to bleeding/bruising, pain/swelling, infection, scar, incomplete removal, nerve damage/numbness, recurrence, and non-diagnostic biopsy, written consent, verbal consent and photographs were obtained. Time-out was performed. The area was cleaned with isopropyl alcohol. 1 mL of 1% lidocaine with epinephrine was injected to obtain adequate anesthesia of the lesion on the Central upper back and L upper chest. Two shave biopsies performed. Hemostasis was achieved with aluminium chloride. Vaseline and a sterile dressing were applied. The patient tolerated the procedure and no " complications were noted. The patient was provided with verbal and written post care instructions.         Assessment and Plan:    1. Neoplasm of uncertain behavior of skin  Concern for basal cell carcinoma x2. Will contact with results when available  - Dermatological path order and indications  - BIOPSY SKIN/SUBQ/MUC MEM, SINGLE LESION  - lidocaine 1% with EPINEPHrine 1:100,000 1 %-1:260548 injection; Inject 3 mLs into the skin once for 1 dose  Dispense: 3 mL; Refill: 0  - BIOPSY SKIN/SUBQ/MUC MEM, EACH ADDTL LESION    2. SK (seborrheic keratosis)  Seborrheic keratoses: Benign hyperkeratotic papules and plaques. No treatment advised unless irritation occurs.      3. Cherry angioma  Cherry angiomas: Benign vascular papules, often familial. No treatment advised.         4. Solar lentiginosis  Solar lentigines: Marker of past sun injury. Ongoing sun protection recommended.       5. Dermatofibroma on the L leg: Benign collagenous collection.     RTC yearly sooner pending biopsy.       Thank you for involving me in this patient's care.     Krissy Uribe MD  Dermatology Staff    CC: Referred MD All  No address on file    CLEMENTINA JESUS MD

## 2018-11-21 LAB — COPATH REPORT: NORMAL

## 2018-11-28 ENCOUNTER — MYC MEDICAL ADVICE (OUTPATIENT)
Dept: FAMILY MEDICINE | Facility: CLINIC | Age: 53
End: 2018-11-28

## 2019-01-15 ENCOUNTER — OFFICE VISIT (OUTPATIENT)
Dept: DERMATOLOGY | Facility: CLINIC | Age: 54
End: 2019-01-15
Payer: COMMERCIAL

## 2019-01-15 VITALS
WEIGHT: 213.8 LBS | HEART RATE: 62 BPM | DIASTOLIC BLOOD PRESSURE: 62 MMHG | SYSTOLIC BLOOD PRESSURE: 114 MMHG | HEIGHT: 63 IN | OXYGEN SATURATION: 99 % | BODY MASS INDEX: 37.88 KG/M2

## 2019-01-15 DIAGNOSIS — C44.519 BASAL CELL CARCINOMA OF ANTERIOR CHEST: Primary | ICD-10-CM

## 2019-01-15 PROCEDURE — 88305 TISSUE EXAM BY PATHOLOGIST: CPT | Mod: TC | Performed by: DERMATOLOGY

## 2019-01-15 PROCEDURE — 11602 EXC TR-EXT MAL+MARG 1.1-2 CM: CPT | Mod: 51 | Performed by: DERMATOLOGY

## 2019-01-15 PROCEDURE — 12032 INTMD RPR S/A/T/EXT 2.6-7.5: CPT | Performed by: DERMATOLOGY

## 2019-01-15 ASSESSMENT — MIFFLIN-ST. JEOR: SCORE: 1540.04

## 2019-01-15 NOTE — LETTER
1/15/2019      RE: Cassandra Burgess  [address] [facility] [address]        Dermatologic Procedure Note        Patient Name:  Cassandra Burgess  Patient :  1965  Medical Record #: [id number]  Date of Operation: 2019    Dermatology Problem List:  1. Solar lentigines  2. Seborrheic keratoses  3. Cherry angiomas  4. Basal cell carcinoma L upper chest s/p excision 19    SURGEON:  Krissy Uribe MD    ASSISTANT:  Tereza Beal    PREOPERATIVE DIAGNOSIS:  Basal cell carcinoma excision    POSTOPERATIVE DIAGNOSIS:  Same    INDICATION: Excision of bcc    PROCEDURE PERFORMED:  1. Excise malignant  2. Intermediate closure trunk    PROCEDURE:    PROCEDURE:  After discussion of risks and benefits of the procedure including the presence of a scar, bleeding and infection following the procedure, written consent was obtained from the patient.  The patient was placed in the supine position and the lesion on the L upper chest was delineated by a marking pen. Local anesthesia included Xylocaine 1% with epinephrine 1:200,000 for a total of 6 ml. The area was cleansed with PCMX and draped in the usual sterile fashion.    Excision was performed using a 15 blade with 4 mm margins on all sides of the lesion. Excision was performed down to subcutaneous fat. Specimen was sent in formalin to pathology.    The wound was closed with 4 4.0  Vicryl deep buried sutures. Wound edges were approximated with running subcuticular 4-0 prolene sutures.     Lesion size: 5 mm  Margins: 4 mm  Final wound length: 4 cm    The wound was dressed with Vaseline and a bandage.  Patient was advised of wound care and healing and instructed to contact us with any concerns. Suture removal was planned in 21 days.  Limitation of activity was discussed in detail.      There were no complications to the procedure or abnormal findings.    Krissy Uribe MD   of Dermatology

## 2019-01-17 RX ORDER — LIDOCAINE HYDROCHLORIDE AND EPINEPHRINE 10; 10 MG/ML; UG/ML
3 INJECTION, SOLUTION INFILTRATION; PERINEURAL ONCE
Status: DISCONTINUED | OUTPATIENT
Start: 2019-01-17 | End: 2023-02-19

## 2019-01-17 NOTE — PROGRESS NOTES
Dermatologic Procedure Note        Patient Name:  Cassandra Burgess  Patient :  1965  Medical Record #: [id number]  Date of Operation: 2019    Dermatology Problem List:  1. Solar lentigines  2. Seborrheic keratoses  3. Cherry angiomas  4. Basal cell carcinoma L upper chest s/p excision 19    SURGEON:  Krissy Uribe MD    ASSISTANT:  Tereza Beal    PREOPERATIVE DIAGNOSIS:  Basal cell carcinoma excision    POSTOPERATIVE DIAGNOSIS:  Same    INDICATION: Excision of bcc    PROCEDURE PERFORMED:  1. Excise malignant  2. Intermediate closure trunk    PROCEDURE:    PROCEDURE:  After discussion of risks and benefits of the procedure including the presence of a scar, bleeding and infection following the procedure, written consent was obtained from the patient.  The patient was placed in the supine position and the lesion on the L upper chest was delineated by a marking pen. Local anesthesia included Xylocaine 1% with epinephrine 1:200,000 for a total of 6 ml. The area was cleansed with PCMX and draped in the usual sterile fashion.    Excision was performed using a 15 blade with 4 mm margins on all sides of the lesion. Excision was performed down to subcutaneous fat. Specimen was sent in formalin to pathology.    The wound was closed with 4 4.0  Vicryl deep buried sutures. Wound edges were approximated with running subcuticular 4-0 prolene sutures.     Lesion size: 5 mm  Margins: 4 mm  Final wound length: 4 cm    The wound was dressed with Vaseline and a bandage.  Patient was advised of wound care and healing and instructed to contact us with any concerns. Suture removal was planned in 21 days.  Limitation of activity was discussed in detail.      There were no complications to the procedure or abnormal findings.    Krissy Uribe MD   of Dermatology

## 2019-01-18 LAB — COPATH REPORT: NORMAL

## 2019-01-25 ENCOUNTER — OFFICE VISIT (OUTPATIENT)
Dept: FAMILY MEDICINE | Facility: CLINIC | Age: 54
End: 2019-01-25
Attending: FAMILY MEDICINE
Payer: COMMERCIAL

## 2019-01-25 VITALS
WEIGHT: 216.8 LBS | HEART RATE: 62 BPM | DIASTOLIC BLOOD PRESSURE: 78 MMHG | BODY MASS INDEX: 38.41 KG/M2 | SYSTOLIC BLOOD PRESSURE: 111 MMHG | HEIGHT: 63 IN

## 2019-01-25 DIAGNOSIS — I10 BENIGN ESSENTIAL HYPERTENSION: ICD-10-CM

## 2019-01-25 PROCEDURE — G0463 HOSPITAL OUTPT CLINIC VISIT: HCPCS | Mod: ZF

## 2019-01-25 RX ORDER — LISINOPRIL 10 MG/1
10 TABLET ORAL DAILY
Qty: 90 TABLET | Refills: 1 | Status: SHIPPED | OUTPATIENT
Start: 2019-01-25 | End: 2019-08-27

## 2019-01-25 ASSESSMENT — ANXIETY QUESTIONNAIRES
1. FEELING NERVOUS, ANXIOUS, OR ON EDGE: SEVERAL DAYS
5. BEING SO RESTLESS THAT IT IS HARD TO SIT STILL: SEVERAL DAYS
6. BECOMING EASILY ANNOYED OR IRRITABLE: SEVERAL DAYS
4. TROUBLE RELAXING: NOT AT ALL
2. NOT BEING ABLE TO STOP OR CONTROL WORRYING: SEVERAL DAYS
3. WORRYING TOO MUCH ABOUT DIFFERENT THINGS: NOT AT ALL
GAD7 TOTAL SCORE: 4
GAD7 TOTAL SCORE: 4
7. FEELING AFRAID AS IF SOMETHING AWFUL MIGHT HAPPEN: NOT AT ALL
7. FEELING AFRAID AS IF SOMETHING AWFUL MIGHT HAPPEN: NOT AT ALL

## 2019-01-25 ASSESSMENT — MIFFLIN-ST. JEOR: SCORE: 1547.53

## 2019-01-25 ASSESSMENT — ENCOUNTER SYMPTOMS
CONSTITUTIONAL NEGATIVE: 1
DIZZINESS: 0
RESPIRATORY NEGATIVE: 1
CARDIOVASCULAR NEGATIVE: 1

## 2019-01-25 NOTE — PROGRESS NOTES
"HPI  Pt. Here for follow-up HTN    Tolerating lisinorpril 10 mg daily, does have tickle in back of throat with it, but not bothersome.  Bp at home at home in \"green\" zone, on monitor, and bp 1 week ago at dermatology was 114/62        Review of Systems   Constitutional: Negative.    Respiratory: Negative.    Cardiovascular: Negative.    Neurological: Negative for dizziness.   Arms falling asleep at night   Answers for HPI/ROS submitted by the patient on 1/25/2019   CARLOS 7 TOTAL SCORE: 4      Physical Exam   Constitutional: She appears well-developed and well-nourished.   Cardiovascular: Normal rate, regular rhythm and normal heart sounds.   Pulmonary/Chest: Effort normal and breath sounds normal.     Blood pressure 111/78, pulse 62, height 1.6 m (5' 3\"), weight 98.3 kg (216 lb 12.8 oz), not currently breastfeeding.    A/P  1. HTN  Controlled on current medication, no changes      Follow-up in  6 months CPE  "

## 2019-01-25 NOTE — LETTER
"  RE: Cassandra Burgess  [address] [facility] [address]     Dear Colleague,    Thank you for referring your patient, Cassandra Burgess, to the [facility] at [facility]. Please see a copy of my visit note below.    HPI  Pt. Here for follow-up HTN    Tolerating lisinorpril 10 mg daily, does have tickle in back of throat with it, but not bothersome.  Bp at home at home in \"green\" zone, on monitor, and bp 1 week ago at dermatology was 114/62    Physical Exam   Constitutional: She appears well-developed and well-nourished.   Cardiovascular: Normal rate, regular rhythm and normal heart sounds.   Pulmonary/Chest: Effort normal and breath sounds normal.     Blood pressure 111/78, pulse 62, height 1.6 m (5' 3\"), weight 98.3 kg (216 lb 12.8 oz), not currently breastfeeding.    A/P  1. HTN  Controlled on current medication, no changes    Follow-up in  6 months CPE    Again, thank you for allowing me to participate in the care of your patient.      Sincerely,    Roland Narayan MD      "

## 2019-01-26 ASSESSMENT — ANXIETY QUESTIONNAIRES: GAD7 TOTAL SCORE: 4

## 2019-03-23 ENCOUNTER — HOSPITAL ENCOUNTER (EMERGENCY)
Facility: CLINIC | Age: 54
Discharge: HOME | End: 2019-03-23
Attending: FAMILY MEDICINE | Admitting: FAMILY MEDICINE
Payer: COMMERCIAL

## 2019-03-23 ENCOUNTER — OFFICE VISIT (OUTPATIENT)
Dept: URGENT CARE | Facility: URGENT CARE | Age: 54
End: 2019-03-23
Payer: COMMERCIAL

## 2019-03-23 VITALS
WEIGHT: 217 LBS | RESPIRATION RATE: 18 BRPM | OXYGEN SATURATION: 95 % | DIASTOLIC BLOOD PRESSURE: 88 MMHG | SYSTOLIC BLOOD PRESSURE: 164 MMHG | TEMPERATURE: 97.5 F | HEIGHT: 63 IN | BODY MASS INDEX: 38.45 KG/M2

## 2019-03-23 VITALS
WEIGHT: 217 LBS | RESPIRATION RATE: 16 BRPM | OXYGEN SATURATION: 97 % | HEART RATE: 63 BPM | TEMPERATURE: 97.9 F | DIASTOLIC BLOOD PRESSURE: 84 MMHG | BODY MASS INDEX: 38.44 KG/M2 | SYSTOLIC BLOOD PRESSURE: 139 MMHG

## 2019-03-23 DIAGNOSIS — H33.21 RIGHT RETINAL DETACHMENT: ICD-10-CM

## 2019-03-23 DIAGNOSIS — H54.61 VISION LOSS OF RIGHT EYE: Primary | ICD-10-CM

## 2019-03-23 PROCEDURE — 99282 EMERGENCY DEPT VISIT SF MDM: CPT | Performed by: FAMILY MEDICINE

## 2019-03-23 PROCEDURE — 99284 EMERGENCY DEPT VISIT MOD MDM: CPT | Mod: Z6 | Performed by: FAMILY MEDICINE

## 2019-03-23 ASSESSMENT — ENCOUNTER SYMPTOMS
VOMITING: 0
DIARRHEA: 0
COUGH: 1
BLOOD IN STOOL: 0
HEADACHES: 0
ABDOMINAL PAIN: 0
NAUSEA: 0
FEVER: 0
SORE THROAT: 0
SHORTNESS OF BREATH: 0
FREQUENCY: 0
DIAPHORESIS: 0
CHILLS: 0
SINUS PRESSURE: 0
PALPITATIONS: 0
DYSURIA: 0
CONSTIPATION: 0
WHEEZING: 0

## 2019-03-23 ASSESSMENT — MIFFLIN-ST. JEOR: SCORE: 1543.44

## 2019-03-23 NOTE — ED NOTES
Being sent from [facility] for eval of C/O loss of vision to medial right eye, blind spot in eye, started on Thursday

## 2019-03-23 NOTE — DISCHARGE INSTRUCTIONS
ICD-10-CM    1. Right retinal detachment H33.21     See Dr. Honorio Barriga at [facility] ([id number]) in [place] at the Retinal Center.  arrive early. Dr. olsen will be there at 4 pm today

## 2019-03-23 NOTE — ED PROVIDER NOTES
History     Chief Complaint   Patient presents with     Loss of Vision     right eye pham spot     MINERVA Burgess is a 54 year old female who presents with onset thursday afternoon with medial RT eye shadowing of vision - that at first was mistaken for side of nose, but persisted when looked to right side, and shadow appeared black.  follows 1 month of off and on floaters in the RT eye.  no flashes.  no eye pain. no eye trauma.  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   no diabetes.  no vision change in the left eye.    no neurologic changes.  No weakness arms/legs.  no changes in speech or swallowing.       No headache.  No palpitations.    recent cough.     Allergies:  No Known Allergies    Problem List:    Patient Active Problem List    Diagnosis Date Noted     SK (seborrheic keratosis) 11/19/2018     Priority: Medium     Cherry angioma 11/19/2018     Priority: Medium     Solar lentiginosis 11/19/2018     Priority: Medium     Neoplasm of uncertain behavior of skin 11/19/2018     Priority: Medium     Benign essential hypertension 11/13/2018     Priority: Medium     S/P complete hysterectomy 07/15/2016     Priority: Medium     Depression with anxiety 05/24/2016     Priority: Medium     Acquired hypothyroidism 05/24/2016     Priority: Medium     Menopausal syndrome (hot flashes) 05/24/2016     Priority: Medium     Obstructive sleep apnea syndrome 07/01/2013     Priority: Medium        Past Medical History:    Past Medical History:   Diagnosis Date     Abnormal Pap smear 1991     Depression 04/2016     Hypothyroid        Past Surgical History:    Past Surgical History:   Procedure Laterality Date     BIOPSY       COLONOSCOPY  2014    no problems     GYN SURGERY  2004    hysterectomy fibrous tumors     HYSTERECTOMY  2004       Family History:    Family History   Problem Relation Age of Onset     Diabetes Maternal Grandmother      Depression Sister      Depression Sister      Multiple Sclerosis Brother      Breast  "Cancer Niece      Anxiety Disorder Daughter      Depression Sister      Depression Sister      Genetic Disorder Nephew         cystic fibrosis     Thyroid Disease Sister      Breast Cancer Niece         currently cancer free       Social History:  Marital Status:   [5]  Social History     Tobacco Use     Smoking status: Never Smoker     Smokeless tobacco: Never Used   Substance Use Topics     Alcohol use: Yes     Alcohol/week: 0.0 oz     Comment: very little     Drug use: No        Medications:      Calcium-Magnesium-Vitamin D (CALCIUM MAGNESIUM PO)   Cholecalciferol (VITAMIN D3 PO)   levothyroxine (SYNTHROID/LEVOTHROID) 137 MCG tablet   lisinopril (PRINIVIL/ZESTRIL) 10 MG tablet   Multiple Vitamins-Minerals (MULTIVITAMIN PO)   Omega-3 Fatty Acids (OMEGA-3 FISH OIL PO)   vitamin  B complex with vitamin C (VITAMIN  B COMPLEX) TABS         Review of Systems   Constitutional: Negative for chills, diaphoresis and fever.   HENT: Negative for ear pain, sinus pressure and sore throat.    Eyes: Positive for visual disturbance.   Respiratory: Positive for cough. Negative for shortness of breath and wheezing.    Cardiovascular: Negative for chest pain and palpitations.   Gastrointestinal: Negative for abdominal pain, blood in stool, constipation, diarrhea, nausea and vomiting.   Genitourinary: Negative for dysuria, frequency and urgency.   Skin: Negative for rash.   Neurological: Negative for headaches.   All other systems reviewed and are negative.         Physical Exam   BP: (!) 167/109  Heart Rate: 62  Temp: 97.5  F (36.4  C)  Resp: 18  Height: 160 cm (5' 3\")  Weight: 98.4 kg (217 lb)  SpO2: 95 %      Physical Exam   Constitutional: She is oriented to person, place, and time. No distress.   Eyes: Conjunctivae and EOM are normal. Pupils are equal, round, and reactive to light. Right eye exhibits no discharge. No foreign body present in the right eye. Left eye exhibits no discharge. No foreign body present in the left " eye. Right conjunctiva is not injected. Left conjunctiva is not injected.   Fundoscopic exam:       The right eye shows no hemorrhage.   Neurological: She is alert and oriented to person, place, and time. She has normal strength. She displays no tremor. No cranial nerve deficit or sensory deficit. She exhibits normal muscle tone. Coordination normal. GCS eye subscore is 4. GCS verbal subscore is 5. GCS motor subscore is 6.   Skin: She is not diaphoretic.         ED Course        Procedures               Critical Care time:  none               No results found for this or any previous visit (from the past 24 hour(s)).    Medications - No data to display    Assessments & Plan (with Medical Decision Making)     MDM: Cassandra Burgess is a 54 year old female who presents with symptoms of RT eye medial aspect darkening/blackness in her visual fields with no lateral loss and no curtain like closure of her vision.  No changes affecting the left eye.  No headache, no eye pressure pain.  No palpitations, atrial fibrillation or neurologic changes such as weakness changes in speech or swallowing.  Findings are most consistent with a vitreous detachment versus retinal detachment and I have spoken with Dr. Malcolm Dupree who has the patient set up with Dr. Honorio Barriga today at the [facility] retinal Center for a dilated eye exam at 4 pm.    Patient has a friend driving her.  I have printed instructions for her.    I have reviewed the nursing notes.    I have reviewed the findings, diagnosis, plan and need for follow up with the patient.           Final diagnoses:   Right retinal detachment - See Dr. Honorio Barriga at [facility] ([address]) in [place] at the Retinal Center.  arrive early. Dr. olsen will be there at 4 pm today       3/23/2019   [facility] EMERGENCY DEPARTMENT     Josef Keita MD  03/23/19 [id number]

## 2019-03-23 NOTE — ED AVS SNAPSHOT
[facility] Emergency Department  [address] [address]  Phone:  [phone]  Fax:  [phone]                                    Cassandra Burgess   MRN: [id number]    Department:  [facility] Emergency Department   Date of Visit:  3/23/2019           After Visit Summary Signature Page    I have received my discharge instructions, and my questions have been answered. I have discussed any challenges I see with this plan with the nurse or doctor.    ..........................................................................................................................................  Patient/Patient Representative Signature      ..........................................................................................................................................  Patient Representative Print Name and Relationship to Patient    ..................................................               ................................................  Date                                   Time    ..........................................................................................................................................  Reviewed by Signature/Title    ...................................................              ..............................................  Date                                               Time          22EPIC Rev 08/18

## 2019-03-23 NOTE — PROGRESS NOTES
SUBJECTIVE:   Cassandra Burgess is a 54 year old female who presents to clinic today for the following health issues:  Chief Complaint   Patient presents with     Eye Problem     blind spot in right eye, wears glasses, when looking out outer eye there is a blind spot, cannot see nose, floaters in her eye          Pt triaged and sent immediately to the [place] ER for further evaluation of right eye medial vision loss which started day before yesterday.        Problem list and histories reviewed & adjusted, as indicated.  Additional history: as documented    Patient Active Problem List   Diagnosis     Depression with anxiety     Acquired hypothyroidism     Menopausal syndrome (hot flashes)     S/P complete hysterectomy     Obstructive sleep apnea syndrome     Benign essential hypertension     SK (seborrheic keratosis)     Cherry angioma     Solar lentiginosis     Neoplasm of uncertain behavior of skin     Past Surgical History:   Procedure Laterality Date     BIOPSY       COLONOSCOPY  2014    no problems     GYN SURGERY  2004    hysterectomy fibrous tumors     HYSTERECTOMY  2004       Social History     Tobacco Use     Smoking status: Never Smoker     Smokeless tobacco: Never Used   Substance Use Topics     Alcohol use: Yes     Alcohol/week: 0.0 oz     Comment: very little     Family History   Problem Relation Age of Onset     Diabetes Maternal Grandmother      Depression Sister      Depression Sister      Multiple Sclerosis Brother      Breast Cancer Niece      Anxiety Disorder Daughter      Depression Sister      Depression Sister      Genetic Disorder Nephew         cystic fibrosis     Thyroid Disease Sister      Breast Cancer Niece         currently cancer free         Current Outpatient Medications   Medication Sig Dispense Refill     Calcium-Magnesium-Vitamin D (CALCIUM MAGNESIUM PO) Take by mouth daily       Cholecalciferol (VITAMIN D3 PO) Take by mouth daily       levothyroxine (SYNTHROID/LEVOTHROID) 137 MCG tablet  Take 1 tablet (137 mcg) by mouth daily 90 tablet 1     lisinopril (PRINIVIL/ZESTRIL) 10 MG tablet Take 1 tablet (10 mg) by mouth daily 90 tablet 1     Multiple Vitamins-Minerals (MULTIVITAMIN PO) Take 4 capsules by mouth daily 50 and wiser women  Raw whole food       Omega-3 Fatty Acids (OMEGA-3 FISH OIL PO)        vitamin  B complex with vitamin C (VITAMIN  B COMPLEX) TABS Take 1 tablet by mouth daily       No Known Allergies  Labs reviewed in EPIC    Reviewed and updated as needed this visit by clinical staff  Tobacco  Allergies  Meds  Med Hx  Surg Hx  Fam Hx  Soc Hx      Reviewed and updated as needed this visit by Provider         ROS:  Constitutional, HEENT, cardiovascular, pulmonary, GI, , musculoskeletal, neuro, skin, endocrine and psych systems are negative, except as otherwise noted.    OBJECTIVE:     /84   Pulse 63   Temp 97.9  F (36.6  C) (Tympanic)   Resp 16   Wt 98.4 kg (217 lb)   SpO2 97%   BMI 38.44 kg/m    Body mass index is 38.44 kg/m .   GENERAL: healthy, alert and no distress, nontoxic in appearance  EYES: Eyes grossly normal to inspection, PERRL and conjunctivae and sclerae normal  HENT: normocephalic      Diagnostic Test Results:  No results found for this or any previous visit (from the past 24 hour(s)).    ASSESSMENT/PLAN:   Needs further evaluation for vision loss. Triaged and sent to ER. Could be detached retina, brain issue or other pathology. No history of any injury.  Problem List Items Addressed This Visit     None      Visit Diagnoses     Vision loss of right eye    -  Primary               There are no Patient Instructions on file for this visit.    TONIE Danielle [facility]

## 2019-04-26 DIAGNOSIS — E03.9 HYPOTHYROIDISM, UNSPECIFIED TYPE: ICD-10-CM

## 2019-04-26 DIAGNOSIS — Z23 NEED FOR DTAP VACCINE: ICD-10-CM

## 2019-04-26 DIAGNOSIS — L98.9 SKIN LESION: ICD-10-CM

## 2019-04-26 DIAGNOSIS — Z00.00 ENCOUNTER FOR ROUTINE ADULT HEALTH EXAMINATION WITHOUT ABNORMAL FINDINGS: ICD-10-CM

## 2019-04-26 RX ORDER — LEVOTHYROXINE SODIUM 137 UG/1
137 TABLET ORAL DAILY
Qty: 90 TABLET | Refills: 1 | Status: SHIPPED | OUTPATIENT
Start: 2019-04-26 | End: 2019-11-26

## 2019-08-27 DIAGNOSIS — I10 BENIGN ESSENTIAL HYPERTENSION: ICD-10-CM

## 2019-08-27 RX ORDER — LISINOPRIL 10 MG/1
10 TABLET ORAL EVERY MORNING
Qty: 90 TABLET | Refills: 0 | Status: SHIPPED | OUTPATIENT
Start: 2019-08-27 | End: 2019-11-26

## 2019-08-27 NOTE — TELEPHONE ENCOUNTER
Received refill request for lisinopril.  Last in clinic 1/2019 with plan for Cassandra to f/u in 6 months. Spoke to Cassandra who will schedule an appt today,she is switching insurance so needs to do that before can get seen. Short term refill sent. Transferred to call center.Pt indicated understanding and agreed with plan.

## 2019-11-26 DIAGNOSIS — I10 BENIGN ESSENTIAL HYPERTENSION: ICD-10-CM

## 2019-11-26 DIAGNOSIS — Z23 NEED FOR DTAP VACCINE: ICD-10-CM

## 2019-11-26 DIAGNOSIS — L98.9 SKIN LESION: ICD-10-CM

## 2019-11-26 DIAGNOSIS — E03.9 HYPOTHYROIDISM, UNSPECIFIED TYPE: ICD-10-CM

## 2019-11-26 DIAGNOSIS — Z00.00 ENCOUNTER FOR ROUTINE ADULT HEALTH EXAMINATION WITHOUT ABNORMAL FINDINGS: ICD-10-CM

## 2019-11-26 RX ORDER — LISINOPRIL 10 MG/1
10 TABLET ORAL EVERY MORNING
Qty: 30 TABLET | Refills: 0 | Status: SHIPPED | OUTPATIENT
Start: 2019-11-26 | End: 2020-01-08 | Stop reason: ALTCHOICE

## 2019-11-26 RX ORDER — LEVOTHYROXINE SODIUM 137 UG/1
137 TABLET ORAL DAILY
Qty: 30 TABLET | Refills: 0 | Status: SHIPPED | OUTPATIENT
Start: 2019-11-26 | End: 2020-01-08

## 2019-11-26 NOTE — TELEPHONE ENCOUNTER
Received refill request for lisinopril. Due for annual exam. Had temporary refill sent August as she was changing insurance so needed some time to get to clinic.    Tried to reach Cassandra but received voicemail.  Left message to call back to schedule annual appointment with Dr. Narayan.    Forwarding to Dr. Narayan.

## 2020-01-08 ENCOUNTER — OFFICE VISIT (OUTPATIENT)
Dept: FAMILY MEDICINE | Facility: CLINIC | Age: 55
End: 2020-01-08
Attending: FAMILY MEDICINE
Payer: COMMERCIAL

## 2020-01-08 VITALS
DIASTOLIC BLOOD PRESSURE: 81 MMHG | SYSTOLIC BLOOD PRESSURE: 138 MMHG | HEIGHT: 63 IN | WEIGHT: 224.8 LBS | HEART RATE: 73 BPM | BODY MASS INDEX: 39.83 KG/M2

## 2020-01-08 DIAGNOSIS — L98.9 SKIN LESION: ICD-10-CM

## 2020-01-08 DIAGNOSIS — Z23 NEED FOR DTAP VACCINE: ICD-10-CM

## 2020-01-08 DIAGNOSIS — R53.83 FATIGUE, UNSPECIFIED TYPE: ICD-10-CM

## 2020-01-08 DIAGNOSIS — E03.9 HYPOTHYROIDISM, UNSPECIFIED TYPE: ICD-10-CM

## 2020-01-08 DIAGNOSIS — I10 BENIGN ESSENTIAL HYPERTENSION: Primary | ICD-10-CM

## 2020-01-08 DIAGNOSIS — Z00.00 ENCOUNTER FOR ROUTINE ADULT HEALTH EXAMINATION WITHOUT ABNORMAL FINDINGS: ICD-10-CM

## 2020-01-08 PROCEDURE — G0463 HOSPITAL OUTPT CLINIC VISIT: HCPCS | Mod: ZF

## 2020-01-08 RX ORDER — LEVOTHYROXINE SODIUM 137 UG/1
137 TABLET ORAL DAILY
Qty: 90 TABLET | Refills: 3 | Status: SHIPPED | OUTPATIENT
Start: 2020-01-08 | End: 2021-01-25

## 2020-01-08 RX ORDER — LOSARTAN POTASSIUM 50 MG/1
50 TABLET ORAL DAILY
Qty: 90 TABLET | Refills: 1 | Status: SHIPPED | OUTPATIENT
Start: 2020-01-08 | End: 2020-06-19

## 2020-01-08 ASSESSMENT — PATIENT HEALTH QUESTIONNAIRE - PHQ9: SUM OF ALL RESPONSES TO PHQ QUESTIONS 1-9: 6

## 2020-01-08 ASSESSMENT — ANXIETY QUESTIONNAIRES
4. TROUBLE RELAXING: NOT AT ALL
2. NOT BEING ABLE TO STOP OR CONTROL WORRYING: NOT AT ALL
GAD7 TOTAL SCORE: 1
GAD7 TOTAL SCORE: 1
7. FEELING AFRAID AS IF SOMETHING AWFUL MIGHT HAPPEN: NOT AT ALL
1. FEELING NERVOUS, ANXIOUS, OR ON EDGE: NOT AT ALL
7. FEELING AFRAID AS IF SOMETHING AWFUL MIGHT HAPPEN: NOT AT ALL
6. BECOMING EASILY ANNOYED OR IRRITABLE: SEVERAL DAYS
5. BEING SO RESTLESS THAT IT IS HARD TO SIT STILL: NOT AT ALL
3. WORRYING TOO MUCH ABOUT DIFFERENT THINGS: NOT AT ALL

## 2020-01-08 ASSESSMENT — ENCOUNTER SYMPTOMS
FOCAL WEAKNESS: 0
POLYDIPSIA: 0
SENSORY CHANGE: 0
HEADACHES: 0
SHORTNESS OF BREATH: 0
PALPITATIONS: 0

## 2020-01-08 ASSESSMENT — MIFFLIN-ST. JEOR: SCORE: 1578.82

## 2020-01-08 NOTE — PROGRESS NOTES
"HPI  Pt. Here for HTN and hypothyriod  Had retina detachment  R eye with laser surgery and tear in L eye treated with laser;  both in March.  Cataract surgery in R eye late July 2019    Last seen 1/2019, will schedule CPE  For another  Visit    1. HTN  On lisinopril 10 mg-still having tickle in throat,  Irritating. Would like to try ARB instead.  --due for BMP    2. Thyroid  Out of medication x 12 days, usually on 137 mcg         Review of Systems   Constitutional: Positive for malaise/fatigue.   Respiratory: Negative for shortness of breath.    Cardiovascular: Negative for chest pain, palpitations and leg swelling.   Neurological: Negative for sensory change, focal weakness and headaches.   Endo/Heme/Allergies: Negative.  Negative for polydipsia.        Answers for HPI/ROS submitted by the patient on 1/8/2020   CARLOS 7 TOTAL SCORE: 1          Physical Exam  Constitutional:       Appearance: Normal appearance.   Neck:      Musculoskeletal: Neck supple.      Vascular: No carotid bruit.   Cardiovascular:      Rate and Rhythm: Normal rate and regular rhythm.   Lymphadenopathy:      Cervical: No cervical adenopathy.   Neurological:      Mental Status: She is alert.     no edema  Blood pressure 138/81, pulse 73, height 1.6 m (5' 3\"), weight 102 kg (224 lb 12.8 oz), not currently breastfeeding.    A/P  1. HTN  Discontinue lisinopril, start losartan 50 mg daily    2. Hyptothyroid   Refill of levothyroxine 137 mcg daily    LABS: BMP, tsh, free t4, cbc, Vit D,  To do in 3 weeks, or just prior to CPE    Pt. To schedule CPE  "

## 2020-01-08 NOTE — LETTER
"1/8/2020       RE: Cassandra Burgess  [address] [facility] [address]     Dear Colleague,    Thank you for referring your patient, Cassandra Burgess, to the [facility] at [facility]. Please see a copy of my visit note below.    HPI  Pt. Here for HTN and hypothyriod  Had retina detachment  R eye with laser surgery and tear in L eye treated with laser;  both in March.  Cataract surgery in R eye late July 2019    Last seen 1/2019, will schedule CPE  For another  Visit    1. HTN  On lisinopril 10 mg-still having tickle in throat,  Irritating. Would like to try ARB instead.  --due for BMP    2. Thyroid  Out of medication x 12 days, usually on 137 mcg         Review of Systems   Constitutional: Positive for malaise/fatigue.   Respiratory: Negative for shortness of breath.    Cardiovascular: Negative for chest pain, palpitations and leg swelling.   Neurological: Negative for sensory change, focal weakness and headaches.   Endo/Heme/Allergies: Negative.  Negative for polydipsia.        Answers for HPI/ROS submitted by the patient on 1/8/2020   CARLOS 7 TOTAL SCORE: 1          Physical Exam  Constitutional:       Appearance: Normal appearance.   Neck:      Musculoskeletal: Neck supple.      Vascular: No carotid bruit.   Cardiovascular:      Rate and Rhythm: Normal rate and regular rhythm.   Lymphadenopathy:      Cervical: No cervical adenopathy.   Neurological:      Mental Status: She is alert.     no edema  Blood pressure 138/81, pulse 73, height 1.6 m (5' 3\"), weight 102 kg (224 lb 12.8 oz), not currently breastfeeding.    A/P  1. HTN  Discontinue lisinopril, start losartan 50 mg daily    2. Hyptothyroid   Refill of levothyroxine 137 mcg daily    LABS: BMP, tsh, free t4, cbc, Vit D,  To do in 3 weeks, or just prior to CPE    Pt. To schedule CPE    Roland Narayan MD      "

## 2020-01-09 ASSESSMENT — ANXIETY QUESTIONNAIRES: GAD7 TOTAL SCORE: 1

## 2020-02-22 DIAGNOSIS — E03.9 HYPOTHYROIDISM, UNSPECIFIED TYPE: ICD-10-CM

## 2020-02-22 DIAGNOSIS — I10 BENIGN ESSENTIAL HYPERTENSION: ICD-10-CM

## 2020-02-22 DIAGNOSIS — R53.83 FATIGUE, UNSPECIFIED TYPE: ICD-10-CM

## 2020-02-22 LAB
ANION GAP SERPL CALCULATED.3IONS-SCNC: 2 MMOL/L (ref 3–14)
BUN SERPL-MCNC: 23 MG/DL (ref 7–30)
CALCIUM SERPL-MCNC: 8.9 MG/DL (ref 8.5–10.1)
CHLORIDE SERPL-SCNC: 107 MMOL/L (ref 94–109)
CO2 SERPL-SCNC: 28 MMOL/L (ref 20–32)
CREAT SERPL-MCNC: 0.86 MG/DL (ref 0.52–1.04)
ERYTHROCYTE [DISTWIDTH] IN BLOOD BY AUTOMATED COUNT: 13.6 % (ref 10–15)
GFR SERPL CREATININE-BSD FRML MDRD: 75 ML/MIN/{1.73_M2}
GLUCOSE SERPL-MCNC: 100 MG/DL (ref 70–99)
HCT VFR BLD AUTO: 42 % (ref 35–47)
HGB BLD-MCNC: 14 G/DL (ref 11.7–15.7)
MCH RBC QN AUTO: 29.7 PG (ref 26.5–33)
MCHC RBC AUTO-ENTMCNC: 33.3 G/DL (ref 31.5–36.5)
MCV RBC AUTO: 89 FL (ref 78–100)
PLATELET # BLD AUTO: 261 10E9/L (ref 150–450)
POTASSIUM SERPL-SCNC: 4.1 MMOL/L (ref 3.4–5.3)
RBC # BLD AUTO: 4.71 10E12/L (ref 3.8–5.2)
SODIUM SERPL-SCNC: 137 MMOL/L (ref 133–144)
T4 FREE SERPL-MCNC: 1.35 NG/DL (ref 0.76–1.46)
TSH SERPL DL<=0.005 MIU/L-ACNC: 0.4 MU/L (ref 0.4–4)
WBC # BLD AUTO: 5.3 10E9/L (ref 4–11)

## 2020-02-22 PROCEDURE — 84439 ASSAY OF FREE THYROXINE: CPT | Performed by: FAMILY MEDICINE

## 2020-02-22 PROCEDURE — 82306 VITAMIN D 25 HYDROXY: CPT | Performed by: FAMILY MEDICINE

## 2020-02-22 PROCEDURE — 80048 BASIC METABOLIC PNL TOTAL CA: CPT | Performed by: FAMILY MEDICINE

## 2020-02-22 PROCEDURE — 36415 COLL VENOUS BLD VENIPUNCTURE: CPT | Performed by: FAMILY MEDICINE

## 2020-02-22 PROCEDURE — 84443 ASSAY THYROID STIM HORMONE: CPT | Performed by: FAMILY MEDICINE

## 2020-02-22 PROCEDURE — 85027 COMPLETE CBC AUTOMATED: CPT | Performed by: FAMILY MEDICINE

## 2020-02-25 LAB
DEPRECATED CALCIDIOL+CALCIFEROL SERPL-MC: <37 UG/L (ref 20–75)
VITAMIN D2 SERPL-MCNC: <5 UG/L
VITAMIN D3 SERPL-MCNC: 32 UG/L

## 2020-02-26 ENCOUNTER — OFFICE VISIT (OUTPATIENT)
Dept: FAMILY MEDICINE | Facility: CLINIC | Age: 55
End: 2020-02-26
Attending: INTERNAL MEDICINE
Payer: COMMERCIAL

## 2020-02-26 VITALS
DIASTOLIC BLOOD PRESSURE: 88 MMHG | HEART RATE: 65 BPM | BODY MASS INDEX: 40.56 KG/M2 | SYSTOLIC BLOOD PRESSURE: 130 MMHG | HEIGHT: 63 IN | WEIGHT: 228.9 LBS

## 2020-02-26 DIAGNOSIS — R53.83 FATIGUE, UNSPECIFIED TYPE: Primary | ICD-10-CM

## 2020-02-26 DIAGNOSIS — Z12.39 SCREENING FOR BREAST CANCER: ICD-10-CM

## 2020-02-26 PROCEDURE — 82306 VITAMIN D 25 HYDROXY: CPT | Performed by: FAMILY MEDICINE

## 2020-02-26 PROCEDURE — 90750 HZV VACC RECOMBINANT IM: CPT | Mod: ZF

## 2020-02-26 PROCEDURE — G0463 HOSPITAL OUTPT CLINIC VISIT: HCPCS | Mod: ZF

## 2020-02-26 PROCEDURE — 36415 COLL VENOUS BLD VENIPUNCTURE: CPT | Performed by: FAMILY MEDICINE

## 2020-02-26 PROCEDURE — 90471 IMMUNIZATION ADMIN: CPT | Mod: ZF

## 2020-02-26 PROCEDURE — 90746 HEPB VACCINE 3 DOSE ADULT IM: CPT | Mod: ZF

## 2020-02-26 PROCEDURE — 25000581 ZZH RX MED A9270 GY (STAT IND- M) 250: Mod: ZF

## 2020-02-26 PROCEDURE — 25000128 H RX IP 250 OP 636: Mod: ZF

## 2020-02-26 PROCEDURE — G0010 ADMIN HEPATITIS B VACCINE: HCPCS

## 2020-02-26 PROCEDURE — 90686 IIV4 VACC NO PRSV 0.5 ML IM: CPT | Mod: ZF

## 2020-02-26 PROCEDURE — 90472 IMMUNIZATION ADMIN EACH ADD: CPT | Mod: ZF

## 2020-02-26 PROCEDURE — G0008 ADMIN INFLUENZA VIRUS VAC: HCPCS | Mod: ZF

## 2020-02-26 ASSESSMENT — ENCOUNTER SYMPTOMS
EYE REDNESS: 0
WEAKNESS: 0
VOMITING: 0
NECK MASS: 0
MEMORY LOSS: 0
HALLUCINATIONS: 0
TASTE DISTURBANCE: 1
EYE IRRITATION: 0
JOINT SWELLING: 1
SMELL DISTURBANCE: 0
WEIGHT GAIN: 1
JAUNDICE: 0
HEARTBURN: 1
LOSS OF CONSCIOUSNESS: 0
SINUS CONGESTION: 0
NECK PAIN: 0
NUMBNESS: 0
HEADACHES: 1
BLOATING: 0
DIARRHEA: 1
RECTAL PAIN: 0
DIZZINESS: 0
WEIGHT LOSS: 0
HOARSE VOICE: 0
SEIZURES: 0
MUSCLE CRAMPS: 1
BACK PAIN: 0
NIGHT SWEATS: 0
TROUBLE SWALLOWING: 1
POLYDIPSIA: 0
ABDOMINAL PAIN: 0
DISTURBANCES IN COORDINATION: 0
FATIGUE: 1
NAUSEA: 1
MUSCLE WEAKNESS: 0
BOWEL INCONTINENCE: 0
STIFFNESS: 0
ALTERED TEMPERATURE REGULATION: 0
SPEECH CHANGE: 0
ARTHRALGIAS: 1
DOUBLE VISION: 0
POLYPHAGIA: 1
FEVER: 0
SORE THROAT: 0
PARALYSIS: 0
SINUS PAIN: 0
INCREASED ENERGY: 0
CONSTIPATION: 0
BLOOD IN STOOL: 0
TREMORS: 0
DECREASED APPETITE: 0
EYE WATERING: 0
TINGLING: 1
MYALGIAS: 0
CHILLS: 0

## 2020-02-26 ASSESSMENT — ANXIETY QUESTIONNAIRES
5. BEING SO RESTLESS THAT IT IS HARD TO SIT STILL: NOT AT ALL
2. NOT BEING ABLE TO STOP OR CONTROL WORRYING: NOT AT ALL
2. NOT BEING ABLE TO STOP OR CONTROL WORRYING: NOT AT ALL
5. BEING SO RESTLESS THAT IT IS HARD TO SIT STILL: NOT AT ALL
7. FEELING AFRAID AS IF SOMETHING AWFUL MIGHT HAPPEN: NOT AT ALL
GAD7 TOTAL SCORE: 3
GAD7 TOTAL SCORE: 2
3. WORRYING TOO MUCH ABOUT DIFFERENT THINGS: NOT AT ALL
4. TROUBLE RELAXING: NOT AT ALL
6. BECOMING EASILY ANNOYED OR IRRITABLE: MORE THAN HALF THE DAYS
7. FEELING AFRAID AS IF SOMETHING AWFUL MIGHT HAPPEN: NOT AT ALL
6. BECOMING EASILY ANNOYED OR IRRITABLE: MORE THAN HALF THE DAYS
7. FEELING AFRAID AS IF SOMETHING AWFUL MIGHT HAPPEN: NOT AT ALL
1. FEELING NERVOUS, ANXIOUS, OR ON EDGE: NOT AT ALL
3. WORRYING TOO MUCH ABOUT DIFFERENT THINGS: SEVERAL DAYS
1. FEELING NERVOUS, ANXIOUS, OR ON EDGE: NOT AT ALL
GAD7 TOTAL SCORE: 3

## 2020-02-26 ASSESSMENT — MIFFLIN-ST. JEOR: SCORE: 1597.41

## 2020-02-26 ASSESSMENT — PATIENT HEALTH QUESTIONNAIRE - PHQ9: 5. POOR APPETITE OR OVEREATING: NOT AT ALL

## 2020-02-26 NOTE — LETTER
2020       RE: Cassandra Burgess  [address] [facility] [address]     Dear Colleague,    Thank you for referring your patient, Cassandra Burgess, to the [facility] at [facility]. Please see a copy of my visit note below.    Pt. Here for CPE      1. Increased reflux and gas symptoms, occ diarrhea x 6 weeks.  2 cups coffee/day.   2. Hypothyroid--Continues on 137 mcg levothyroxine.  UTD labs, no change  3 . Depression and anxiety--in remission, off medication, helped with Vit D and B complexes, magnesium. Taking supplements irregularly, using light box, more fatigue since . 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 Breast--no other concerns, due mammogram, last mammogram , all normal in past. Average risk.  Knows needs to do mamogram, but generally dislikes them.  5. Gyne-- LMP-- approx, s/p hysterectomy for fibroids, ovaries left. Menses regular prior to that. Paps normal except right after childbearing, with normal colposcopy. No night sweats..Signs of menopause in early 50's..  (twins) Vaginal with 1 breech (flipped). Near 7 and 8 #. (children age 25+ now).    No vaginal symptoms.   Some urinary leaking when cough/sneeze, tolerable.     (x 4 yrs,  x 25 years) Currently sexually active with male partner, monogamous, no condoms use.   No sexual concerns,   No hx STI ( did have herpes). Started hep B vaccine 1 out of 3  HIV testing in past?  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     Hep C negative.    5. Bone--eat dairy, some. Calcium supplement irregular. Vit D supplement: irregular, off and on... unknown dose. Activity: walking daily, but not 10,000 steps/day. Use sunlamp in winter.     6. HCM--   dtaP ,   colonsocopy normal ,   lipids   HDL 72, glucose  100  No flu vaccine--  Shingrix--due  7.  HTN--on losartan 50 mg, not check bp outside office; normal BMP 2/2020     Current Outpatient Medications   Medication     levothyroxine (SYNTHROID/LEVOTHROID) 137 MCG tablet     losartan (COZAAR) 50 MG tablet     Calcium-Magnesium-Vitamin D (CALCIUM MAGNESIUM PO)     Cholecalciferol (VITAMIN D3 PO)     Multiple Vitamins-Minerals (MULTIVITAMIN PO)     Omega-3 Fatty Acids (OMEGA-3 FISH OIL PO)     vitamin  B complex with vitamin C (VITAMIN  B COMPLEX) TABS     Current Facility-Administered Medications   Medication     lidocaine 1% with EPINEPHrine 1:100,000 injection 3 mL           PMH  Hysterectomy           Family History                Family History   Problem Relation Age of Onset     Diabetes Maternal Grandmother        Depression Sister        Depression Sister        Multiple Sclerosis Brother        Breast Cancer Niece        Anxiety Disorder Daughter        Depression Sister        Depression Sister        Genetic Disorder Nephew            cystic fibrosis     Thyroid Disease Sister              Mother--well  Father--d. Lung cancer  Mat grandfather--bipolar, mat uncle bipolar (both suicide)  No changes since 2018     SH  Standard diet  ETOH--1/week, 1/sitting  No tobacco  Activity above      Answers for HPI/ROS submitted by the patient on 2/26/2020   CARLOS 7 TOTAL SCORE: 3  General Symptoms: Yes  Skin Symptoms: No  HENT Symptoms: Yes  EYE SYMPTOMS: Yes  HEART SYMPTOMS: No  LUNG SYMPTOMS: No  INTESTINAL SYMPTOMS: Yes  URINARY SYMPTOMS: No  GYNECOLOGIC SYMPTOMS: No  BREAST SYMPTOMS: No  SKELETAL SYMPTOMS: Yes  BLOOD SYMPTOMS: No  NERVOUS SYSTEM SYMPTOMS: Yes  MENTAL HEALTH SYMPTOMS: No  Fever: No  Loss of appetite: No  Weight loss: No  Weight gain: Yes  Fatigue: Yes  Night sweats: No  Chills: No  Increased stress: No  Excessive hunger: Yes--intermittent  Excessive thirst: No  Feeling hot or cold when others believe the temperature is normal: No  Loss of height: No  Post-operative complications: No  Surgical site  "pain: No  Hallucinations: No  Change in or Loss of Energy: No  Hyperactivity: No  Confusion: No  Ear pain: No  Ear discharge: No  Hearing loss: No  Tinnitus: No  Nosebleeds: No  Congestion: No  Sinus pain: No  Trouble swallowing: Yes--intermittent, solids and liquids, doesn't quite go down, every 2 months, not new   Voice hoarseness: No  Mouth sores: No  Sore throat: No  Tooth pain: No  Gum tenderness: No  Bleeding gums: No  Change in taste: Yes  Change in sense of smell: No  Dry mouth: No  Hearing aid used: No  Neck lump: No  Vision loss: Yes--no vision cut or double vision, change in acuity  Dry eyes: No  Watery eyes: No  Eye bulging: No  Double vision: No  Flashing of lights: No  Spots: No  Floaters: No  Redness: No  Crossed eyes: No  Tunnel Vision: No  Yellowing of eyes: No  Eye irritation: No  Heart burn or indigestion: Yes  Nausea: Yes  Vomiting: No  Abdominal pain: No  Bloating: No  Constipation: No  Diarrhea: Yes  Blood in stool: No  Black stools: No  Rectal or Anal pain: No  Fecal incontinence: No  Yellowing of skin or eyes: No  Vomit with blood: No  Change in stools: Yes--diarrhea intermittently, past 2 months, no trigger  Back pain: No  Muscle aches: No  Neck pain: No  Swollen joints: Yes--knee, only with certain shoes  Joint pain: Yes  Bone pain: No  Muscle cramps: Yes  Muscle weakness: No  Joint stiffness: No  Bone fracture: No  Trouble with coordination: No  Dizziness or trouble with balance: No  Fainting or black-out spells: No  Memory loss: No  Headache: Yes--lack of caffeine  Seizures: No  Speech problems: No  Tingling: Yes  Tremor: No  Weakness: No  Difficulty walking: No  Paralysis: No  Numbness: No  Reviewed with jose antonio ÁLVAREZ  Blood pressure (!) 144/89, pulse 66, height 1.6 m (5' 3\"), weight 103.8 kg (228 lb 14.4 oz), not currently breastfeeding.  bp protocol ave 130/88  Constitutional: Well appearing woman in no acute distress.   Psychological: appropriate mood.  Eyes: anicteric, normal " extra-ocular movements,  pupils are equal and reactive to light.   Ears, Nose and Throat: tympanic membranes clear, nose clear and free of lesions, throat clear, moist mucous membrames, neck supple with full range of motion.    Neck: No thyroidmegaly. No jugular venous distension, no carotid bruits.  Cardiovascular: regular rate and rhythm, normal S1 and S2, no murmurs, rubs or gallops, peripheral pulses full and symmetric   Respiratory: clear to auscultation, no wheezes or crackles, normal breath sounds.  Gastrointestinal: positive bowel sounds, nontender, no hepatosplenomegaly, no masses. No guarding or rebound.  Adnexa without tenderness or enlarged. Rectal exam with normal sphincter tone, no masses. Perineum without lesions.   Lymphatic: no cervical lymphadenopathy.  Musculoskeletal: full range of motion, no edema and motor strength is equal in the upper and lower extremities    Skin: no concerning lesions, no jaundice.  Neurological: cranial nerves intact, normal strength and sensation, reflexes at patella and biceps normal, normal gait, no tremor.   Monofilament Foot Exam: n/a  A/P  1. HCM-- counseled patient on recommend vaccines; today--flu vaccine, Hep B, shingrix  Immunizations--      Cancer screening--:  Mammogram     CV risk- Up to date    Bone Health--recommend Vitamin D level due to unknown intake, continue weight bearing activity, increase general activity  STI/HIV Screening --HIV testing recommend once in lifetime, will review records to see if ever done.    2. HTN controlled on current medication  3. Heartburn and reflux--trial of OTC PPI or other acid reducer daily x 4-6 weeks. If symptoms more   Or persistent, will refer to GI    Follow-up 6 months  Roland Narayan MD

## 2020-02-26 NOTE — NURSING NOTE
Clinic Administered Medication Documentation      Injectable Medication Documentation    Patient was given FLUZONE. Prior to medication administration, verified patients identity using patient s name and date of birth. Please see MAR and medication order for additional information. Patient instructed to remain in clinic for 15 minutes.      Was entire vial of medication used? Yes  Vial/Syringe: Single dose vial  Expiration Date:  06/30/2020  Was this medication supplied by the patient? No

## 2020-02-26 NOTE — PROGRESS NOTES
Pt. Here for CPE      1. Increased reflux and gas symptoms, occ diarrhea x 6 weeks.  2 cups coffee/day.   2. Hypothyroid--Continues on 137 mcg levothyroxine. UTD labs, no change  3 . Depression and anxiety--in remission, off medication, helped with Vit D and B complexes, magnesium. Taking supplements irregularly, using light box, more fatigue since Luis. 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 Breast--no other concerns, due mammogram, last mammogram , all normal in past. Average risk.  Knows needs to do mamogram, but generally dislikes them.  5. Gyne-- LMP-- approx, s/p hysterectomy for fibroids, ovaries left. Menses regular prior to that. Paps normal except right after childbearing, with normal colposcopy. No night sweats..Signs of menopause in early 50's..  (twins) Vaginal with 1 breech (flipped). Near 7 and 8 #. (children age 25+ now).    No vaginal symptoms.   Some urinary leaking when cough/sneeze, tolerable.     (x 4 yrs,  x 25 years) Currently sexually active with male partner, monogamous, no condoms use.  No sexual concerns,   No hx STI ( did have herpes). Started hep B vaccine 1 out of 3  HIV testing in past?  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     Hep C negative.    5. Bone--eat dairy, some. Calcium supplement irregular. Vit D supplement: irregular, off and on... unknown dose. Activity: walking daily, but not 10,000 steps/day. Use sunlamp in winter.     6. HCM--   dtaP 2017,   colonsocopy normal ,   lipids 2017  HDL 72, glucose  100  No flu vaccine--  Shingrix--due  7. HTN--on losartan 50 mg, not check bp outside office; normal BMP 2020     Current Outpatient Medications   Medication     levothyroxine (SYNTHROID/LEVOTHROID) 137 MCG tablet     losartan (COZAAR) 50 MG tablet     Calcium-Magnesium-Vitamin D (CALCIUM MAGNESIUM PO)      Cholecalciferol (VITAMIN D3 PO)     Multiple Vitamins-Minerals (MULTIVITAMIN PO)     Omega-3 Fatty Acids (OMEGA-3 FISH OIL PO)     vitamin  B complex with vitamin C (VITAMIN  B COMPLEX) TABS     Current Facility-Administered Medications   Medication     lidocaine 1% with EPINEPHrine 1:100,000 injection 3 mL           PMH  Hysterectomy           Family History                Family History   Problem Relation Age of Onset     Diabetes Maternal Grandmother        Depression Sister        Depression Sister        Multiple Sclerosis Brother        Breast Cancer Niece        Anxiety Disorder Daughter        Depression Sister        Depression Sister        Genetic Disorder Nephew            cystic fibrosis     Thyroid Disease Sister              Mother--well  Father--d. Lung cancer  Mat grandfather--bipolar, mat uncle bipolar (both suicide)  No changes since 2018       Standard diet  ETOH--1/week, 1/sitting  No tobacco  Activity above      Answers for HPI/ROS submitted by the patient on 2/26/2020   CARLOS 7 TOTAL SCORE: 3  General Symptoms: Yes  Skin Symptoms: No  HENT Symptoms: Yes  EYE SYMPTOMS: Yes  HEART SYMPTOMS: No  LUNG SYMPTOMS: No  INTESTINAL SYMPTOMS: Yes  URINARY SYMPTOMS: No  GYNECOLOGIC SYMPTOMS: No  BREAST SYMPTOMS: No  SKELETAL SYMPTOMS: Yes  BLOOD SYMPTOMS: No  NERVOUS SYSTEM SYMPTOMS: Yes  MENTAL HEALTH SYMPTOMS: No  Fever: No  Loss of appetite: No  Weight loss: No  Weight gain: Yes  Fatigue: Yes  Night sweats: No  Chills: No  Increased stress: No  Excessive hunger: Yes--intermittent  Excessive thirst: No  Feeling hot or cold when others believe the temperature is normal: No  Loss of height: No  Post-operative complications: No  Surgical site pain: No  Hallucinations: No  Change in or Loss of Energy: No  Hyperactivity: No  Confusion: No  Ear pain: No  Ear discharge: No  Hearing loss: No  Tinnitus: No  Nosebleeds: No  Congestion: No  Sinus pain: No  Trouble swallowing: Yes--intermittent, solids and liquids,  "doesn't quite go down, every 2 months, not new   Voice hoarseness: No  Mouth sores: No  Sore throat: No  Tooth pain: No  Gum tenderness: No  Bleeding gums: No  Change in taste: Yes  Change in sense of smell: No  Dry mouth: No  Hearing aid used: No  Neck lump: No  Vision loss: Yes--no vision cut or double vision, change in acuity  Dry eyes: No  Watery eyes: No  Eye bulging: No  Double vision: No  Flashing of lights: No  Spots: No  Floaters: No  Redness: No  Crossed eyes: No  Tunnel Vision: No  Yellowing of eyes: No  Eye irritation: No  Heart burn or indigestion: Yes  Nausea: Yes  Vomiting: No  Abdominal pain: No  Bloating: No  Constipation: No  Diarrhea: Yes  Blood in stool: No  Black stools: No  Rectal or Anal pain: No  Fecal incontinence: No  Yellowing of skin or eyes: No  Vomit with blood: No  Change in stools: Yes--diarrhea intermittently, past 2 months, no trigger  Back pain: No  Muscle aches: No  Neck pain: No  Swollen joints: Yes--knee, only with certain shoes  Joint pain: Yes  Bone pain: No  Muscle cramps: Yes  Muscle weakness: No  Joint stiffness: No  Bone fracture: No  Trouble with coordination: No  Dizziness or trouble with balance: No  Fainting or black-out spells: No  Memory loss: No  Headache: Yes--lack of caffeine  Seizures: No  Speech problems: No  Tingling: Yes  Tremor: No  Weakness: No  Difficulty walking: No  Paralysis: No  Numbness: No  Reviewed with jose antonio ÁLVAREZ  Blood pressure (!) 144/89, pulse 66, height 1.6 m (5' 3\"), weight 103.8 kg (228 lb 14.4 oz), not currently breastfeeding.  bp protocol ave 130/88  Constitutional: Well appearing woman in no acute distress.   Psychological: appropriate mood.  Eyes: anicteric, normal extra-ocular movements,  pupils are equal and reactive to light.   Ears, Nose and Throat: tympanic membranes clear, nose clear and free of lesions, throat clear, moist mucous membrames, neck supple with full range of motion.    Neck: No thyroidmegaly. No jugular venous " distension, no carotid bruits.  Cardiovascular: regular rate and rhythm, normal S1 and S2, no murmurs, rubs or gallops, peripheral pulses full and symmetric   Respiratory: clear to auscultation, no wheezes or crackles, normal breath sounds.  Gastrointestinal: positive bowel sounds, nontender, no hepatosplenomegaly, no masses. No guarding or rebound.  Adnexa without tenderness or enlarged. Rectal exam with normal sphincter tone, no masses. Perineum without lesions.   Lymphatic: no cervical lymphadenopathy.  Musculoskeletal: full range of motion, no edema and motor strength is equal in the upper and lower extremities    Skin: no concerning lesions, no jaundice.  Neurological: cranial nerves intact, normal strength and sensation, reflexes at patella and biceps normal, normal gait, no tremor.   Monofilament Foot Exam: n/a    A/P  1. HCM-- counseled patient on recommend vaccines; today--flu vaccine, Hep B, shingrix  Immunizations--      Cancer screening--:  Mammogram     CV risk- Up to date    Bone Health--recommend Vitamin D level due to unknown intake, continue weight bearing activity, increase general activity  STI/HIV Screening --HIV testing recommend once in lifetime, will review records to see if ever done.    2. HTN controlled on current medication  3. Heartburn and reflux--trial of OTC PPI or other acid reducer daily x 4-6 weeks. If symptoms more   Or persistent, will refer to GI    Follow-up 6 months

## 2020-02-28 LAB
DEPRECATED CALCIDIOL+CALCIFEROL SERPL-MC: <38 UG/L (ref 20–75)
VITAMIN D2 SERPL-MCNC: <5 UG/L
VITAMIN D3 SERPL-MCNC: 33 UG/L

## 2020-03-03 NOTE — RESULT ENCOUNTER NOTE
Dear Cassandra,   Here are your recent results which are within the expected range. Please continue with your current plan of care.       Roland Narayan MD

## 2020-03-10 ENCOUNTER — HEALTH MAINTENANCE LETTER (OUTPATIENT)
Age: 55
End: 2020-03-10

## 2020-06-19 DIAGNOSIS — I10 BENIGN ESSENTIAL HYPERTENSION: ICD-10-CM

## 2020-06-19 RX ORDER — LOSARTAN POTASSIUM 50 MG/1
50 TABLET ORAL DAILY
Qty: 90 TABLET | Refills: 0 | Status: SHIPPED | OUTPATIENT
Start: 2020-06-19 | End: 2020-07-16 | Stop reason: DRUGHIGH

## 2020-06-19 NOTE — TELEPHONE ENCOUNTER
Received refill request for losartan.  Last in clinic 2/2020 and Dr. Narayan wanted to see her in 6 months.    Spoke with Cassnadra and informed her of refill request and need to follow-up with Dr. Narayan. Can be a video visit as Cassandra confirmed she has a BP cuff at home. Provided instruction on what is needed for a video visit. Emailed '[employer] Video Visit' information sheet.  Forwarded her to  to schedule.  Short-term supply of medication sent to pharmacy.

## 2020-07-01 ENCOUNTER — VIRTUAL VISIT (OUTPATIENT)
Dept: FAMILY MEDICINE | Facility: CLINIC | Age: 55
End: 2020-07-01
Attending: FAMILY MEDICINE
Payer: COMMERCIAL

## 2020-07-01 DIAGNOSIS — I10 BENIGN ESSENTIAL HYPERTENSION: Primary | ICD-10-CM

## 2020-07-01 NOTE — LETTER
"7/1/2020       RE: Cassandra Burgess  [address] [facility] [address]     Dear Colleague,    Thank you for referring your patient, Cassandra Burgess, to the [facility] at [facility]. Please see a copy of my visit note below.    Cassandra Burgess is a 55 year old female who is being evaluated via a billable telephone visit.      The patient has been notified of following:     \"This telephone visit will be conducted via a call between you and your physician/provider. We have found that certain health care needs can be provided without the need for a physical exam.  This service lets us provide the care you need with a short phone conversation.  If a prescription is necessary we can send it directly to your pharmacy.  If lab work is needed we can place an order for that and you can then stop by our lab to have the test done at a later time.    Telephone visits are billed at different rates depending on your insurance coverage. During this emergency period, for some insurers they may be billed the same as an in-person visit.  Please reach out to your insurance provider with any questions.    If during the course of the call the physician/provider feels a telephone visit is not appropriate, you will not be charged for this service.\"    Patient has given verbal consent for Telephone visit?  Yes    What phone number would you like to be contacted at? See preferred phone number        Subjective     Cassandra Burgess is a 55 year old female who presents via phone visit today for the following health issues:    HPI   Due to for refills on medications and higher blood pressure    1. HTN--checking bp at home-- 144/106 x 6 times today, but never taken after sitting for 5 minutes first. Haven't been checking bp other than today.  Recently diagnosed with swollen area on retina, (had surgery 1 year ago), now on drops again, so stressful  Not workig due to pandemic  Continues on 50 mg losartan " daily  No lightheadedness  No other concerns    Starting doing 1 mile/day on treadmill  {  Patient Active Problem List   Diagnosis     Depression with anxiety     Acquired hypothyroidism     Menopausal syndrome (hot flashes)     S/P complete hysterectomy     Obstructive sleep apnea syndrome     Benign essential hypertension     SK (seborrheic keratosis)     Cherry angioma     Solar lentiginosis     Neoplasm of uncertain behavior of skin     Past Surgical History:   Procedure Laterality Date     BIOPSY       COLONOSCOPY  2014    no problems     EYE SURGERY  2019    Retina detachmentrigt, tear left cataract right     GYN SURGERY  2004    hysterectomy fibrous tumors     HYSTERECTOMY  2004       Social History     Tobacco Use     Smoking status: Never Smoker     Smokeless tobacco: Never Used   Substance Use Topics     Alcohol use: Yes     Alcohol/week: 0.0 standard drinks     Comment: very little     Family History   Problem Relation Age of Onset     Diabetes Maternal Grandmother      Depression Sister      Depression Sister      Multiple Sclerosis Brother      Breast Cancer Niece      Anxiety Disorder Daughter      Depression Sister      Depression Sister      Genetic Disorder Nephew         cystic fibrosis     Thyroid Disease Sister      Breast Cancer Niece         currently cancer free         Current Outpatient Medications   Medication Sig Dispense Refill     levothyroxine (SYNTHROID/LEVOTHROID) 137 MCG tablet Take 1 tablet (137 mcg) by mouth daily 90 tablet 3     losartan (COZAAR) 50 MG tablet Take 1 tablet (50 mg) by mouth daily 90 tablet 0     No Known Allergies    Reviewed and updated as needed this visit by Provider         Review of Systems   CONSTITUTIONAL:NEGATIVE for fever, chills, change in weight and d  RESP:NEGATIVE for significant cough or SOB  CV: NEGATIVE for chest pain, palpitations or peripheral edema   GI ---Prilosec helped reflux, but still have belcihing and gas,     Objective   Reported vitals:   There were no vitals taken for this visit.   healthy, alert and no distress  PSYCH: Alert and oriented times 3; coherent speech, normal   rate and volume, able to articulate logical thoughts, able   to abstract reason, no tangential thoughts, no hallucinations   or delusions  Her affect is normal  RESP: No cough, no audible wheezing, able to talk in full sentences  Remainder of exam unable to be completed due to telephone visits    food diary   --    Assessment/Plan:  1. HTN  Increase Losartan to 100 mg daily  Check home bp after sitting x 5 minutes, some Am and some PM values, send in to [facility] (5 values)  Has many 50 mg tabs left, just double up    2. GERD/gas  Recommend keep food diary to see which foods associated with symptoms; discussed possible suspects including dairy, Brassica veggies (broccoli, etc)      Due CPE Feb  mychart bp's before    Phone call duration: 16* minutes    Roland Narayan MD

## 2020-07-01 NOTE — PROGRESS NOTES
"Cassandra Burgess is a 55 year old female who is being evaluated via a billable telephone visit.      The patient has been notified of following:     \"This telephone visit will be conducted via a call between you and your physician/provider. We have found that certain health care needs can be provided without the need for a physical exam.  This service lets us provide the care you need with a short phone conversation.  If a prescription is necessary we can send it directly to your pharmacy.  If lab work is needed we can place an order for that and you can then stop by our lab to have the test done at a later time.    Telephone visits are billed at different rates depending on your insurance coverage. During this emergency period, for some insurers they may be billed the same as an in-person visit.  Please reach out to your insurance provider with any questions.    If during the course of the call the physician/provider feels a telephone visit is not appropriate, you will not be charged for this service.\"    Patient has given verbal consent for Telephone visit?  Yes    What phone number would you like to be contacted at? See preferred phone number        Subjective     Cassandra Burgess is a 55 year old female who presents via phone visit today for the following health issues:    HPI   Due to for refills on medications and higher blood pressure    1. HTN--checking bp at home-- 144/106 x 6 times today, but never taken after sitting for 5 minutes first. Haven't been checking bp other than today.  Recently diagnosed with swollen area on retina, (had surgery 1 year ago), now on drops again, so stressful  Not workig due to pandemic  Continues on 50 mg losartan daily  No lightheadedness  No other concerns    Starting doing 1 mile/day on treadmill  {  Patient Active Problem List   Diagnosis     Depression with anxiety     Acquired hypothyroidism     Menopausal syndrome (hot flashes)     S/P complete hysterectomy     Obstructive sleep " apnea syndrome     Benign essential hypertension     SK (seborrheic keratosis)     Cherry angioma     Solar lentiginosis     Neoplasm of uncertain behavior of skin     Past Surgical History:   Procedure Laterality Date     BIOPSY       COLONOSCOPY  2014    no problems     EYE SURGERY  2019    Retina detachmentrigt, tear left cataract right     GYN SURGERY  2004    hysterectomy fibrous tumors     HYSTERECTOMY  2004       Social History     Tobacco Use     Smoking status: Never Smoker     Smokeless tobacco: Never Used   Substance Use Topics     Alcohol use: Yes     Alcohol/week: 0.0 standard drinks     Comment: very little     Family History   Problem Relation Age of Onset     Diabetes Maternal Grandmother      Depression Sister      Depression Sister      Multiple Sclerosis Brother      Breast Cancer Niece      Anxiety Disorder Daughter      Depression Sister      Depression Sister      Genetic Disorder Nephew         cystic fibrosis     Thyroid Disease Sister      Breast Cancer Niece         currently cancer free         Current Outpatient Medications   Medication Sig Dispense Refill     levothyroxine (SYNTHROID/LEVOTHROID) 137 MCG tablet Take 1 tablet (137 mcg) by mouth daily 90 tablet 3     losartan (COZAAR) 50 MG tablet Take 1 tablet (50 mg) by mouth daily 90 tablet 0     No Known Allergies    Reviewed and updated as needed this visit by Provider         Review of Systems   CONSTITUTIONAL:NEGATIVE for fever, chills, change in weight and d  RESP:NEGATIVE for significant cough or SOB  CV: NEGATIVE for chest pain, palpitations or peripheral edema   GI ---Prilosec helped reflux, but still have belcihing and gas,     Objective   Reported vitals:  There were no vitals taken for this visit.   healthy, alert and no distress  PSYCH: Alert and oriented times 3; coherent speech, normal   rate and volume, able to articulate logical thoughts, able   to abstract reason, no tangential thoughts, no hallucinations   or delusions   Her affect is normal  RESP: No cough, no audible wheezing, able to talk in full sentences  Remainder of exam unable to be completed due to telephone visits    food diary   --    Assessment/Plan:  1. HTN  Increase Losartan to 100 mg daily  Check home bp after sitting x 5 minutes, some Am and some PM values, send in to [facility] (5 values)  Has many 50 mg tabs left, just double up    2. GERD/gas  Recommend keep food diary to see which foods associated with symptoms; discussed possible suspects including dairy, Brassica veggies (broccoli, etc)      Due CPE Feb  mychart bp's before    Phone call duration: 16* minutes    Roland Narayan MD

## 2020-07-14 ENCOUNTER — MYC MEDICAL ADVICE (OUTPATIENT)
Dept: FAMILY MEDICINE | Facility: CLINIC | Age: 55
End: 2020-07-14

## 2020-07-14 DIAGNOSIS — I10 BENIGN ESSENTIAL HYPERTENSION: Primary | ICD-10-CM

## 2020-07-16 RX ORDER — LOSARTAN POTASSIUM 100 MG/1
100 TABLET ORAL DAILY
Qty: 90 TABLET | Refills: 1 | Status: SHIPPED | OUTPATIENT
Start: 2020-07-16 | End: 2021-03-23

## 2020-09-25 ENCOUNTER — TRANSFERRED RECORDS (OUTPATIENT)
Dept: HEALTH INFORMATION MANAGEMENT | Facility: CLINIC | Age: 55
End: 2020-09-25

## 2020-12-27 ENCOUNTER — HEALTH MAINTENANCE LETTER (OUTPATIENT)
Age: 55
End: 2020-12-27

## 2021-01-22 ENCOUNTER — MYC MEDICAL ADVICE (OUTPATIENT)
Dept: FAMILY MEDICINE | Facility: CLINIC | Age: 56
End: 2021-01-22

## 2021-01-22 DIAGNOSIS — Z23 NEED FOR DTAP VACCINE: ICD-10-CM

## 2021-01-22 DIAGNOSIS — L98.9 SKIN LESION: ICD-10-CM

## 2021-01-22 DIAGNOSIS — Z00.00 ENCOUNTER FOR ROUTINE ADULT HEALTH EXAMINATION WITHOUT ABNORMAL FINDINGS: ICD-10-CM

## 2021-01-22 DIAGNOSIS — E03.9 HYPOTHYROIDISM, UNSPECIFIED TYPE: ICD-10-CM

## 2021-01-25 RX ORDER — LEVOTHYROXINE SODIUM 137 UG/1
137 TABLET ORAL DAILY
Qty: 90 TABLET | Refills: 3 | Status: SHIPPED | OUTPATIENT
Start: 2021-01-25 | End: 2021-02-24

## 2021-02-24 ENCOUNTER — OFFICE VISIT (OUTPATIENT)
Dept: FAMILY MEDICINE | Facility: CLINIC | Age: 56
End: 2021-02-24
Attending: FAMILY MEDICINE
Payer: COMMERCIAL

## 2021-02-24 VITALS
DIASTOLIC BLOOD PRESSURE: 90 MMHG | WEIGHT: 232 LBS | BODY MASS INDEX: 41.11 KG/M2 | SYSTOLIC BLOOD PRESSURE: 140 MMHG | HEART RATE: 64 BPM | HEIGHT: 63 IN

## 2021-02-24 DIAGNOSIS — I10 BENIGN ESSENTIAL HYPERTENSION: ICD-10-CM

## 2021-02-24 DIAGNOSIS — Z00.00 ENCOUNTER FOR ROUTINE ADULT HEALTH EXAMINATION WITHOUT ABNORMAL FINDINGS: ICD-10-CM

## 2021-02-24 DIAGNOSIS — E66.01 MORBID OBESITY (H): ICD-10-CM

## 2021-02-24 DIAGNOSIS — E03.9 HYPOTHYROIDISM, UNSPECIFIED TYPE: ICD-10-CM

## 2021-02-24 DIAGNOSIS — Z23 NEED FOR DTAP VACCINE: ICD-10-CM

## 2021-02-24 DIAGNOSIS — Z11.4 SCREENING FOR HIV (HUMAN IMMUNODEFICIENCY VIRUS): Primary | ICD-10-CM

## 2021-02-24 DIAGNOSIS — L98.9 SKIN LESION: ICD-10-CM

## 2021-02-24 DIAGNOSIS — Z12.31 ENCOUNTER FOR SCREENING MAMMOGRAM FOR BREAST CANCER: ICD-10-CM

## 2021-02-24 LAB
ANION GAP SERPL CALCULATED.3IONS-SCNC: 7 MMOL/L (ref 3–14)
BUN SERPL-MCNC: 21 MG/DL (ref 7–30)
CALCIUM SERPL-MCNC: 9.6 MG/DL (ref 8.5–10.1)
CHLORIDE SERPL-SCNC: 107 MMOL/L (ref 94–109)
CO2 SERPL-SCNC: 27 MMOL/L (ref 20–32)
CREAT SERPL-MCNC: 0.86 MG/DL (ref 0.52–1.04)
GFR SERPL CREATININE-BSD FRML MDRD: 74 ML/MIN/{1.73_M2}
GLUCOSE SERPL-MCNC: 91 MG/DL (ref 70–99)
POTASSIUM SERPL-SCNC: 4.4 MMOL/L (ref 3.4–5.3)
SODIUM SERPL-SCNC: 141 MMOL/L (ref 133–144)
T4 FREE SERPL-MCNC: 1.45 NG/DL (ref 0.76–1.46)
TSH SERPL DL<=0.005 MIU/L-ACNC: 0.88 MU/L (ref 0.4–4)

## 2021-02-24 PROCEDURE — G0463 HOSPITAL OUTPT CLINIC VISIT: HCPCS | Mod: 25

## 2021-02-24 PROCEDURE — 87389 HIV-1 AG W/HIV-1&-2 AB AG IA: CPT | Performed by: FAMILY MEDICINE

## 2021-02-24 PROCEDURE — 90686 IIV4 VACC NO PRSV 0.5 ML IM: CPT

## 2021-02-24 PROCEDURE — G0463 HOSPITAL OUTPT CLINIC VISIT: HCPCS

## 2021-02-24 PROCEDURE — 250N000011 HC RX IP 250 OP 636

## 2021-02-24 PROCEDURE — 36415 COLL VENOUS BLD VENIPUNCTURE: CPT | Performed by: FAMILY MEDICINE

## 2021-02-24 PROCEDURE — G0008 ADMIN INFLUENZA VIRUS VAC: HCPCS

## 2021-02-24 PROCEDURE — 84439 ASSAY OF FREE THYROXINE: CPT | Performed by: FAMILY MEDICINE

## 2021-02-24 PROCEDURE — 99396 PREV VISIT EST AGE 40-64: CPT | Performed by: FAMILY MEDICINE

## 2021-02-24 PROCEDURE — 80048 BASIC METABOLIC PNL TOTAL CA: CPT | Performed by: FAMILY MEDICINE

## 2021-02-24 PROCEDURE — 84443 ASSAY THYROID STIM HORMONE: CPT | Performed by: FAMILY MEDICINE

## 2021-02-24 RX ORDER — LOSARTAN POTASSIUM AND HYDROCHLOROTHIAZIDE 12.5; 1 MG/1; MG/1
1 TABLET ORAL DAILY
Qty: 90 TABLET | Refills: 1 | Status: SHIPPED | OUTPATIENT
Start: 2021-02-24 | End: 2021-08-23

## 2021-02-24 RX ORDER — LEVOTHYROXINE SODIUM 137 UG/1
137 TABLET ORAL DAILY
Qty: 90 TABLET | Refills: 3 | Status: SHIPPED | OUTPATIENT
Start: 2021-02-24 | End: 2022-03-16

## 2021-02-24 RX ORDER — LOSARTAN POTASSIUM 100 MG/1
100 TABLET ORAL DAILY
Qty: 90 TABLET | Refills: 1 | Status: CANCELLED | OUTPATIENT
Start: 2021-02-24

## 2021-02-24 ASSESSMENT — ANXIETY QUESTIONNAIRES
5. BEING SO RESTLESS THAT IT IS HARD TO SIT STILL: SEVERAL DAYS
3. WORRYING TOO MUCH ABOUT DIFFERENT THINGS: SEVERAL DAYS
GAD7 TOTAL SCORE: 7
GAD7 TOTAL SCORE: 7
7. FEELING AFRAID AS IF SOMETHING AWFUL MIGHT HAPPEN: SEVERAL DAYS
1. FEELING NERVOUS, ANXIOUS, OR ON EDGE: SEVERAL DAYS
4. TROUBLE RELAXING: SEVERAL DAYS
6. BECOMING EASILY ANNOYED OR IRRITABLE: SEVERAL DAYS
2. NOT BEING ABLE TO STOP OR CONTROL WORRYING: SEVERAL DAYS
7. FEELING AFRAID AS IF SOMETHING AWFUL MIGHT HAPPEN: SEVERAL DAYS

## 2021-02-24 ASSESSMENT — ENCOUNTER SYMPTOMS
NECK MASS: 0
PALPITATIONS: 0
RECTAL PAIN: 0
EYE WATERING: 1
DOUBLE VISION: 0
HYPERTENSION: 1
MYALGIAS: 0
SLEEP DISTURBANCES DUE TO BREATHING: 0
TASTE DISTURBANCE: 0
JOINT SWELLING: 0
EYE IRRITATION: 1
SYNCOPE: 0
ABDOMINAL PAIN: 1
SORE THROAT: 0
ARTHRALGIAS: 0
CONSTIPATION: 1
SINUS PAIN: 0
MUSCLE CRAMPS: 0
LIGHT-HEADEDNESS: 1
ORTHOPNEA: 0
HEARTBURN: 0
EXERCISE INTOLERANCE: 0
NECK PAIN: 0
VOMITING: 0
SINUS CONGESTION: 0
LEG PAIN: 0
BOWEL INCONTINENCE: 0
BLOATING: 1
SMELL DISTURBANCE: 0
HYPOTENSION: 0
JAUNDICE: 0
NAUSEA: 0
HOARSE VOICE: 0
DIARRHEA: 1
EYE PAIN: 0
STIFFNESS: 0
TROUBLE SWALLOWING: 0
BLOOD IN STOOL: 0
BACK PAIN: 1
SKIN CHANGES: 1
MUSCLE WEAKNESS: 0
EYE REDNESS: 0

## 2021-02-24 ASSESSMENT — PAIN SCALES - GENERAL: PAINLEVEL: NO PAIN (0)

## 2021-02-24 ASSESSMENT — MIFFLIN-ST. JEOR: SCORE: 1606.48

## 2021-02-24 NOTE — PROGRESS NOTES
Pt. Here for CPE      Chronic conditions  1. Increased reflux and gas symptoms: stable, overall improved with occasional flares, alternating constipation/diarrhea   2. Hypothyroid--Continues on 137 mcg levothyroxine. Due for labs  3 . Depression and anxiety--in remission, off medication,  4. HTN--on losartan 100 mg 140/90. Not checking at home. --due BMP    Preventive Care  4. Breast--no other concerns, due mammogram, last mammogram , all normal in past. Average risk.  Knows needs to do mamogram, but generally dislikes them.  5. Gyne-- LMP-- approx, s/p hysterectomy for fibroids, ovaries left. Menses regular prior to that. Paps normal except right after childbearing, with normal colposcopy. No night sweats..Signs of menopause in early 50's..  (twins) Vaginal with 1 breech (flipped). Near 7 and 8 #. (children age 25+ now).   Vaginal symptoms: none   Urinary: Some urinary leaking when cough/sneeze, tolerable.     (x 4 yrs,  x 25 years) Currently sexually active with male partner, monogamous, no condoms use.  No sexual concerns,   No hx STI ( did have herpes). Started hep B vaccine 1 out of 3  HIV testing in past? 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     Hep C negative.      5. Bone--eat dairy, some. Calcium supplement 2/wk. Vit D supplement: 2x/wk   Activity: own treadmill, prior to Covid walking daily, but not 10,000 steps/day. Use sunlamp in winter.   No fhx osteoporosis      6. Immunizations  Flu vaccine  Today    7. Colon cancer screen   colonsocopy normal , follow-up 10 years  8. CV   lipids 2017  HDL 72,   glucose 2020 100           Current Outpatient Medications   Medication     levothyroxine (SYNTHROID/LEVOTHROID) 137 MCG tablet     losartan (COZAAR) 50 MG tablet     Calcium-Magnesium-Vitamin D (CALCIUM MAGNESIUM PO)     Cholecalciferol (VITAMIN D3 PO)     Multiple  Vitamins-Minerals (MULTIVITAMIN PO)     Omega-3 Fatty Acids (OMEGA-3 FISH OIL PO)     vitamin  B complex with vitamin C (VITAMIN  B COMPLEX) TABS          Current Facility-Administered Medications   Medication     lidocaine 1% with EPINEPHrine 1:100,000 injection 3 mL             PMH  Hysterectomy           Family History                Family History   Problem Relation Age of Onset     Diabetes Maternal Grandmother        Depression Sister        Depression Sister        Multiple Sclerosis Brother        Breast Cancer Niece        Anxiety Disorder Daughter        Depression Sister        Depression Sister        Genetic Disorder Nephew            cystic fibrosis     Thyroid Disease Sister              Mother--well  Father--d. Lung cancer  Mat grandfather--bipolar, mat uncle bipolar (both suicide)  No changes since 2020     SH  Standard diet  ETOH--1/week, 1/sitting  No tobacco  Activity above  Answers for HPI/ROS submitted by the patient on 2/24/2021   CARLOS 7 TOTAL SCORE: 7  General Symptoms: No  Skin Symptoms: Yes  HENT Symptoms: Yes  EYE SYMPTOMS: Yes  HEART SYMPTOMS: Yes  LUNG SYMPTOMS: No  INTESTINAL SYMPTOMS: Yes  URINARY SYMPTOMS: No  GYNECOLOGIC SYMPTOMS: No  BREAST SYMPTOMS: No  SKELETAL SYMPTOMS: Yes  BLOOD SYMPTOMS: No  NERVOUS SYSTEM SYMPTOMS: No  MENTAL HEALTH SYMPTOMS: No  Changes in moles/birth marks: Yes  Itching: Yes  Ear pain: No  Ear discharge: No  Hearing loss: No  Tinnitus: No  Nosebleeds: No  Congestion: No  Sinus pain: No  Trouble swallowing: No   Voice hoarseness: No  Mouth sores: No  Sore throat: No  Tooth pain: No  Gum tenderness: No  Bleeding gums: No  Change in taste: No  Change in sense of smell: No  Dry mouth: No  Hearing aid used: No  Neck lump: No  Eye pain: No  Vision loss: No  Dry eyes: Yes  Watery eyes: Yes  Eye bulging: No  Double vision: No  Flashing of lights: No  Spots: No  Floaters: Yes  Redness: No  Crossed eyes: No  Tunnel Vision: No  Yellowing of eyes: No  Eye irritation:  "Yes  Chest pain or pressure: No  Fast or irregular heartbeat: No  Pain in legs with walking: No  Trouble breathing while lying down: No  Fingers or toes appear blue: No  High blood pressure: Yes  Low blood pressure: No  Fainting: No  Murmurs: No  Pacemaker: No  Varicose veins: No  Edema or swelling: No  Wake up at night with shortness of breath: No  Light-headedness: Yes  Exercise intolerance: No  Heart burn or indigestion: No  Nausea: No  Vomiting: No  Abdominal pain: Yes  Bloating: Yes  Constipation: Yes  Diarrhea: Yes  Blood in stool: No  Black stools: No  Rectal or Anal pain: No  Fecal incontinence: No  Yellowing of skin or eyes: No  Vomit with blood: No  Change in stools: Yes  Back pain: Yes  Muscle aches: No  Neck pain: No  Swollen joints: No  Joint pain: No  Bone pain: No  Muscle cramps: No  Muscle weakness: No  Joint stiffness: No  Bone fracture: No    Blood pressure (!) 140/90, pulse 64, height 1.6 m (5' 3\"), weight 105.2 kg (232 lb), not currently breastfeeding.  EXAM:  Constitutional: healthy, alert and no distress   Cardiovascular: negative, PMI normal. No lifts, heaves, or thrills. RRR. No murmurs, clicks gallops or rub  Respiratory: negative, Percussion normal. Good diaphragmatic excursion. Lungs clear  Psychiatric: mentation appears normal and affect normal/bright  Neck: Neck supple. No adenopathy. Thyroid symmetric, normal size,, Carotids without bruits.  ENT: ENT exam normal, no neck nodes or sinus tenderness  Abdomen: Abdomen soft, non-tender. BS normal. No masses, organomegaly  NEURO: Gait normal. Reflexes normal and symmetric. Sensation grossly WNL., negative findings: cranial nerves 2-12 intact, muscle strength normal, reflexes normal and symmetric  SKIN: no suspicious lesions or rashes; small raised, stuck on rough lesion on R mid back   LYMPH: Normal cervical lymph nodes  JOINT/EXTREMITIES: extremities normal- no gross deformities noted, gait normal and normal muscle tone  Breast exam: no " masses, no skin changes, no nipple discharge, no axillary adenopathy    A/P  1. HCM  Immunizations--  Flu vaccine    Cancer screening--  Mammogram       CV risk- see below, lipid screening up to date    Bone Health-- counseled on adequate weight bearing activity and vitamin D intake    STI/HIV Screening --HIV screening      Contraception--not needed    2. HTN--not at goal BP. Discussed options, change to Losartan hydrochlorothiazide 100/12.5 mg daily, check MBP  3. Hypothyroid--due for TSH, free T4, continue levothyroixine  4. Advance care directives counseling given    Follow-up 3 months for HTN

## 2021-02-24 NOTE — LETTER
2021       RE: Cassandra Burgess  [address] [facility] [address]     Dear Colleague,    Thank you for referring your patient, Cassandra Burgess, to the [facility] [place] at [facility]. Please see a copy of my visit note below.    Pt. Here for CPE      Chronic conditions  1. Increased reflux and gas symptoms: stable, overall improved with occasional flares, alternating constipation/diarrhea   2. Hypothyroid--Continues on 137 mcg levothyroxine. Due for labs  3 . Depression and anxiety--in remission, off medication,  4. HTN--on losartan 100 mg 140/90. Not checking at home. --due BMP    Preventive Care  4. Breast--no other concerns, due mammogram, last mammogram , all normal in past. Average risk.  Knows needs to do mamogram, but generally dislikes them.  5. Gyne-- LMP-- approx, s/p hysterectomy for fibroids, ovaries left. Menses regular prior to that. Paps normal except right after childbearing, with normal colposcopy. No night sweats..Signs of menopause in early 50's..  (twins) Vaginal with 1 breech (flipped). Near 7 and 8 #. (children age 25+ now).   Vaginal symptoms: none   Urinary: Some urinary leaking when cough/sneeze, tolerable.     (x 4 yrs,  x 25 years) Currently sexually active with male partner, monogamous, no condoms use.  No sexual concerns,   No hx STI ( did have herpes). Started hep B vaccine 1 out of 3  HIV testing in past? 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     Hep C negative.      5. Bone--eat dairy, some. Calcium supplement 2/wk. Vit D supplement: 2x/wk   Activity: own treadmill, prior to Covid walking daily, but not 10,000 steps/day. Use sunlamp in winter.   No fhx osteoporosis      6. Immunizations  Flu vaccine  Today    7. Colon cancer screen   colonsocopy normal , follow-up 10 years  8. CV   lipids  2017  HDL 72,   glucose 2020 100           Current Outpatient Medications   Medication     levothyroxine (SYNTHROID/LEVOTHROID) 137 MCG tablet     losartan (COZAAR) 50 MG tablet     Calcium-Magnesium-Vitamin D (CALCIUM MAGNESIUM PO)     Cholecalciferol (VITAMIN D3 PO)     Multiple Vitamins-Minerals (MULTIVITAMIN PO)     Omega-3 Fatty Acids (OMEGA-3 FISH OIL PO)     vitamin  B complex with vitamin C (VITAMIN  B COMPLEX) TABS          Current Facility-Administered Medications   Medication     lidocaine 1% with EPINEPHrine 1:100,000 injection 3 mL             PMH  Hysterectomy           Family History                Family History   Problem Relation Age of Onset     Diabetes Maternal Grandmother        Depression Sister        Depression Sister        Multiple Sclerosis Brother        Breast Cancer Niece        Anxiety Disorder Daughter        Depression Sister        Depression Sister        Genetic Disorder Nephew            cystic fibrosis     Thyroid Disease Sister              Mother--well  Father--d. Lung cancer  Mat grandfather--bipolar, mat uncle bipolar (both suicide)  No changes since 2020     SH  Standard diet  ETOH--1/week, 1/sitting  No tobacco  Activity above  Answers for HPI/ROS submitted by the patient on 2/24/2021   CARLOS 7 TOTAL SCORE: 7  General Symptoms: No  Skin Symptoms: Yes  HENT Symptoms: Yes  EYE SYMPTOMS: Yes  HEART SYMPTOMS: Yes  LUNG SYMPTOMS: No  INTESTINAL SYMPTOMS: Yes  URINARY SYMPTOMS: No  GYNECOLOGIC SYMPTOMS: No  BREAST SYMPTOMS: No  SKELETAL SYMPTOMS: Yes  BLOOD SYMPTOMS: No  NERVOUS SYSTEM SYMPTOMS: No  MENTAL HEALTH SYMPTOMS: No  Changes in moles/birth marks: Yes  Itching: Yes  Ear pain: No  Ear discharge: No  Hearing loss: No  Tinnitus: No  Nosebleeds: No  Congestion: No  Sinus pain: No  Trouble swallowing: No   Voice hoarseness: No  Mouth sores: No  Sore throat: No  Tooth pain: No  Gum tenderness: No  Bleeding gums: No  Change in taste: No  Change in sense of smell: No  Dry  "mouth: No  Hearing aid used: No  Neck lump: No  Eye pain: No  Vision loss: No  Dry eyes: Yes  Watery eyes: Yes  Eye bulging: No  Double vision: No  Flashing of lights: No  Spots: No  Floaters: Yes  Redness: No  Crossed eyes: No  Tunnel Vision: No  Yellowing of eyes: No  Eye irritation: Yes  Chest pain or pressure: No  Fast or irregular heartbeat: No  Pain in legs with walking: No  Trouble breathing while lying down: No  Fingers or toes appear blue: No  High blood pressure: Yes  Low blood pressure: No  Fainting: No  Murmurs: No  Pacemaker: No  Varicose veins: No  Edema or swelling: No  Wake up at night with shortness of breath: No  Light-headedness: Yes  Exercise intolerance: No  Heart burn or indigestion: No  Nausea: No  Vomiting: No  Abdominal pain: Yes  Bloating: Yes  Constipation: Yes  Diarrhea: Yes  Blood in stool: No  Black stools: No  Rectal or Anal pain: No  Fecal incontinence: No  Yellowing of skin or eyes: No  Vomit with blood: No  Change in stools: Yes  Back pain: Yes  Muscle aches: No  Neck pain: No  Swollen joints: No  Joint pain: No  Bone pain: No  Muscle cramps: No  Muscle weakness: No  Joint stiffness: No  Bone fracture: No    Blood pressure (!) 140/90, pulse 64, height 1.6 m (5' 3\"), weight 105.2 kg (232 lb), not currently breastfeeding.  EXAM:  Constitutional: healthy, alert and no distress   Cardiovascular: negative, PMI normal. No lifts, heaves, or thrills. RRR. No murmurs, clicks gallops or rub  Respiratory: negative, Percussion normal. Good diaphragmatic excursion. Lungs clear  Psychiatric: mentation appears normal and affect normal/bright  Neck: Neck supple. No adenopathy. Thyroid symmetric, normal size,, Carotids without bruits.  ENT: ENT exam normal, no neck nodes or sinus tenderness  Abdomen: Abdomen soft, non-tender. BS normal. No masses, organomegaly  NEURO: Gait normal. Reflexes normal and symmetric. Sensation grossly WNL., negative findings: cranial nerves 2-12 intact, muscle strength " normal, reflexes normal and symmetric  SKIN: no suspicious lesions or rashes; small raised, stuck on rough lesion on R mid back   LYMPH: Normal cervical lymph nodes  JOINT/EXTREMITIES: extremities normal- no gross deformities noted, gait normal and normal muscle tone  Breast exam: no masses, no skin changes, no nipple discharge, no axillary adenopathy    A/P  1. HCM  Immunizations--  Flu vaccine    Cancer screening--  Mammogram       CV risk- see below, lipid screening up to date    Bone Health-- counseled on adequate weight bearing activity and vitamin D intake    STI/HIV Screening --HIV screening      Contraception--not needed    2. HTN--not at goal BP. Discussed options, change to Losartan hydrochlorothiazide 100/12.5 mg daily, check MBP  3. Hypothyroid--due for TSH, free T4, continue levothyroixine  4. Advance care directives counseling given    Follow-up 3 months for HTN    Again, thank you for allowing me to participate in the care of your patient.      Sincerely,    Roland Narayan MD

## 2021-02-25 LAB — HIV 1+2 AB+HIV1 P24 AG SERPL QL IA: NONREACTIVE

## 2021-02-25 ASSESSMENT — ANXIETY QUESTIONNAIRES: GAD7 TOTAL SCORE: 7

## 2021-03-09 NOTE — RESULT ENCOUNTER NOTE
Dear Cassandra,     Here are your recent results which are within the expected range. Please continue with your current plan of care and let us know if you have any questions or concerns.    Regards,  Roland Narayan MD

## 2021-03-15 ENCOUNTER — MYC MEDICAL ADVICE (OUTPATIENT)
Dept: FAMILY MEDICINE | Facility: CLINIC | Age: 56
End: 2021-03-15

## 2021-03-23 PROBLEM — Z98.890 HISTORY OF REPAIR OF RETINAL TEAR BY LASER PHOTOCOAGULATION: Status: ACTIVE | Noted: 2019-03-24

## 2021-03-29 ENCOUNTER — MEDICAL CORRESPONDENCE (OUTPATIENT)
Dept: HEALTH INFORMATION MANAGEMENT | Facility: CLINIC | Age: 56
End: 2021-03-29

## 2021-05-14 ENCOUNTER — HOSPITAL ENCOUNTER (OUTPATIENT)
Dept: MAMMOGRAPHY | Facility: CLINIC | Age: 56
Discharge: HOME | End: 2021-05-14
Attending: FAMILY MEDICINE | Admitting: FAMILY MEDICINE
Payer: COMMERCIAL

## 2021-05-14 DIAGNOSIS — Z12.31 ENCOUNTER FOR SCREENING MAMMOGRAM FOR BREAST CANCER: ICD-10-CM

## 2021-05-14 PROCEDURE — 77067 SCR MAMMO BI INCL CAD: CPT

## 2021-05-25 NOTE — RESULT ENCOUNTER NOTE
Dear Cassandra,     Here are your recent results which are within the expected normal range. Please continue with your current plan of care and let us know if you have any questions or concerns.    Regards,  Roland Narayan MD

## 2021-05-27 ASSESSMENT — ANXIETY QUESTIONNAIRES
GAD7 TOTAL SCORE: 1
1. FEELING NERVOUS, ANXIOUS, OR ON EDGE: NOT AT ALL
GAD7 TOTAL SCORE: 1
7. FEELING AFRAID AS IF SOMETHING AWFUL MIGHT HAPPEN: NOT AT ALL
4. TROUBLE RELAXING: NOT AT ALL
6. BECOMING EASILY ANNOYED OR IRRITABLE: SEVERAL DAYS
7. FEELING AFRAID AS IF SOMETHING AWFUL MIGHT HAPPEN: NOT AT ALL
2. NOT BEING ABLE TO STOP OR CONTROL WORRYING: NOT AT ALL
3. WORRYING TOO MUCH ABOUT DIFFERENT THINGS: NOT AT ALL
5. BEING SO RESTLESS THAT IT IS HARD TO SIT STILL: NOT AT ALL

## 2021-05-28 ENCOUNTER — OFFICE VISIT (OUTPATIENT)
Dept: FAMILY MEDICINE | Facility: CLINIC | Age: 56
End: 2021-05-28
Attending: FAMILY MEDICINE
Payer: COMMERCIAL

## 2021-05-28 VITALS
HEIGHT: 63 IN | SYSTOLIC BLOOD PRESSURE: 120 MMHG | HEART RATE: 58 BPM | DIASTOLIC BLOOD PRESSURE: 80 MMHG | WEIGHT: 231 LBS | BODY MASS INDEX: 40.93 KG/M2

## 2021-05-28 DIAGNOSIS — I10 BENIGN ESSENTIAL HYPERTENSION: ICD-10-CM

## 2021-05-28 DIAGNOSIS — E03.9 HYPOTHYROIDISM, UNSPECIFIED TYPE: Primary | ICD-10-CM

## 2021-05-28 PROCEDURE — 99214 OFFICE O/P EST MOD 30 MIN: CPT | Performed by: FAMILY MEDICINE

## 2021-05-28 ASSESSMENT — MIFFLIN-ST. JEOR: SCORE: 1596.94

## 2021-05-28 ASSESSMENT — ANXIETY QUESTIONNAIRES: GAD7 TOTAL SCORE: 1

## 2021-05-28 NOTE — LETTER
Date:July 3, 2021      Patient was self referred, no letter generated. Do not send.        [facility] Health Information

## 2021-05-28 NOTE — PROGRESS NOTES
"  Jeremiah Trujillo is a 56 year old who presents for the following health issues     HPI     1. HTN   Now on losartan/HCTZ  100/12.5. no problems with medication  No recent outside BP  Feeling less stressed as well    2. Hypothyroid     Reviewed 2/24/2021 labs with patient; at upper end of normal range. No weight changes or palpitations.   No need to adjust dose of levothyroxine  Reviewed that given need to separate time between thyroid and other medications and will sometimes have conflict, more important to take HTN meds. Rare that it happens--2-3x/month maximum.      Has CPAP now.  Review of Systems   CONSTITUTIONAL: NEGATIVE for fever, chills, change in weight  ENT/MOUTH: NEGATIVE for ear, mouth and throat problems  RESP: NEGATIVE for significant cough or SOB  CV: NEGATIVE for chest pain, palpitations or peripheral edema  ENDOCRINE: NEGATIVE for temperature intolerance, skin/hair changes      Objective    /80   Pulse 58   Ht 1.6 m (5' 3\")   Wt 104.8 kg (231 lb)   BMI 40.92 kg/m    Body mass index is 40.92 kg/m .  Physical Exam   EXAM:  Constitutional: healthy, alert and no distress   Cardiovascular: negative, PMI normal. No lifts, heaves, or thrills. RRR. No murmurs, clicks gallops or rub  Respiratory: negative, Percussion normal. Good diaphragmatic excursion. Lungs clear        Office Visit on 02/24/2021   Component Date Value Ref Range Status     HIV Antigen Antibody Combo 02/24/2021 Nonreactive  NR^Nonreactive     Final    HIV-1 p24 Ag & HIV-1/HIV-2 Ab Not Detected     Sodium 02/24/2021 141  133 - 144 mmol/L Final     Potassium 02/24/2021 4.4  3.4 - 5.3 mmol/L Final     Chloride 02/24/2021 107  94 - 109 mmol/L Final     Carbon Dioxide 02/24/2021 27  20 - 32 mmol/L Final     Anion Gap 02/24/2021 7  3 - 14 mmol/L Final     Glucose 02/24/2021 91  70 - 99 mg/dL Final     Urea Nitrogen 02/24/2021 21  7 - 30 mg/dL Final     Creatinine 02/24/2021 0.86  0.52 - 1.04 mg/dL Final     GFR Estimate 02/24/2021 " 74  >60 mL/min/[1.73_m2] Final    Comment: Non  GFR Calc  Starting 12/18/2018, serum creatinine based estimated GFR (eGFR) will be   calculated using the Chronic Kidney Disease Epidemiology Collaboration   (CKD-EPI) equation.       GFR Estimate If Black 02/24/2021 86  >60 mL/min/[1.73_m2] Final    Comment:  GFR Calc  Starting 12/18/2018, serum creatinine based estimated GFR (eGFR) will be   calculated using the Chronic Kidney Disease Epidemiology Collaboration   (CKD-EPI) equation.       Calcium 02/24/2021 9.6  8.5 - 10.1 mg/dL Final     TSH 02/24/2021 0.88  0.40 - 4.00 mU/L Final     T4 Free 02/24/2021 1.45  0.76 - 1.46 ng/dL Final       A/P  1. HTN  2. Hypothyroid  Doing well on current medications  Lab review and counseling as above    Follow-up  6 months

## 2021-05-28 NOTE — LETTER
"5/28/2021       RE: Cassandra Burgess  [address] [facility] [address]     Dear Colleague,    Thank you for referring your patient, Cassandra Burgess, to the [facility] [place] at [facility]. Please see a copy of my visit note below.      Subjective   Cassandra is a 56 year old who presents for the following health issues     HPI     1. HTN   Now on losartan/HCTZ  100/12.5. no problems with medication  No recent outside BP  Feeling less stressed as well    2. Hypothyroid     Reviewed 2/24/2021 labs with patient; at upper end of normal range. No weight changes or palpitations.   No need to adjust dose of levothyroxine  Reviewed that given need to separate time between thyroid and other medications and will sometimes have conflict, more important to take HTN meds. Rare that it happens--2-3x/month maximum.      Has CPAP now.  Review of Systems   CONSTITUTIONAL: NEGATIVE for fever, chills, change in weight  ENT/MOUTH: NEGATIVE for ear, mouth and throat problems  RESP: NEGATIVE for significant cough or SOB  CV: NEGATIVE for chest pain, palpitations or peripheral edema  ENDOCRINE: NEGATIVE for temperature intolerance, skin/hair changes      Objective    /80   Pulse 58   Ht 1.6 m (5' 3\")   Wt 104.8 kg (231 lb)   BMI 40.92 kg/m    Body mass index is 40.92 kg/m .  Physical Exam   EXAM:  Constitutional: healthy, alert and no distress   Cardiovascular: negative, PMI normal. No lifts, heaves, or thrills. RRR. No murmurs, clicks gallops or rub  Respiratory: negative, Percussion normal. Good diaphragmatic excursion. Lungs clear        Office Visit on 02/24/2021   Component Date Value Ref Range Status     HIV Antigen Antibody Combo 02/24/2021 Nonreactive  NR^Nonreactive     Final    HIV-1 p24 Ag & HIV-1/HIV-2 Ab Not Detected     Sodium 02/24/2021 141  133 - 144 mmol/L Final     Potassium 02/24/2021 4.4  3.4 - 5.3 mmol/L Final     Chloride 02/24/2021 107  94 - " 109 mmol/L Final     Carbon Dioxide 02/24/2021 27  20 - 32 mmol/L Final     Anion Gap 02/24/2021 7  3 - 14 mmol/L Final     Glucose 02/24/2021 91  70 - 99 mg/dL Final     Urea Nitrogen 02/24/2021 21  7 - 30 mg/dL Final     Creatinine 02/24/2021 0.86  0.52 - 1.04 mg/dL Final     GFR Estimate 02/24/2021 74  >60 mL/min/[1.73_m2] Final    Comment: Non  GFR Calc  Starting 12/18/2018, serum creatinine based estimated GFR (eGFR) will be   calculated using the Chronic Kidney Disease Epidemiology Collaboration   (CKD-EPI) equation.       GFR Estimate If Black 02/24/2021 86  >60 mL/min/[1.73_m2] Final    Comment:  GFR Calc  Starting 12/18/2018, serum creatinine based estimated GFR (eGFR) will be   calculated using the Chronic Kidney Disease Epidemiology Collaboration   (CKD-EPI) equation.       Calcium 02/24/2021 9.6  8.5 - 10.1 mg/dL Final     TSH 02/24/2021 0.88  0.40 - 4.00 mU/L Final     T4 Free 02/24/2021 1.45  0.76 - 1.46 ng/dL Final       A/P  1. HTN  2. Hypothyroid  Doing well on current medications  Lab review and counseling as above    Follow-up  6 months        Again, thank you for allowing me to participate in the care of your patient.      Sincerely,    Roland Narayan MD

## 2021-08-23 DIAGNOSIS — I10 BENIGN ESSENTIAL HYPERTENSION: ICD-10-CM

## 2021-08-23 RX ORDER — LOSARTAN POTASSIUM AND HYDROCHLOROTHIAZIDE 12.5; 1 MG/1; MG/1
1 TABLET ORAL DAILY
Qty: 90 TABLET | Refills: 1 | Status: SHIPPED | OUTPATIENT
Start: 2021-08-23 | End: 2022-03-16

## 2021-10-09 ENCOUNTER — HEALTH MAINTENANCE LETTER (OUTPATIENT)
Age: 56
End: 2021-10-09

## 2022-03-15 ASSESSMENT — ANXIETY QUESTIONNAIRES
7. FEELING AFRAID AS IF SOMETHING AWFUL MIGHT HAPPEN: SEVERAL DAYS
6. BECOMING EASILY ANNOYED OR IRRITABLE: MORE THAN HALF THE DAYS
7. FEELING AFRAID AS IF SOMETHING AWFUL MIGHT HAPPEN: SEVERAL DAYS
5. BEING SO RESTLESS THAT IT IS HARD TO SIT STILL: NOT AT ALL
4. TROUBLE RELAXING: NOT AT ALL
2. NOT BEING ABLE TO STOP OR CONTROL WORRYING: SEVERAL DAYS
3. WORRYING TOO MUCH ABOUT DIFFERENT THINGS: SEVERAL DAYS
1. FEELING NERVOUS, ANXIOUS, OR ON EDGE: SEVERAL DAYS
GAD7 TOTAL SCORE: 6
GAD7 TOTAL SCORE: 6

## 2022-03-16 ENCOUNTER — LAB (OUTPATIENT)
Dept: LAB | Facility: CLINIC | Age: 57
End: 2022-03-16
Attending: FAMILY MEDICINE
Payer: COMMERCIAL

## 2022-03-16 ENCOUNTER — OFFICE VISIT (OUTPATIENT)
Dept: FAMILY MEDICINE | Facility: CLINIC | Age: 57
End: 2022-03-16
Attending: FAMILY MEDICINE
Payer: COMMERCIAL

## 2022-03-16 VITALS
HEART RATE: 80 BPM | DIASTOLIC BLOOD PRESSURE: 75 MMHG | BODY MASS INDEX: 40.57 KG/M2 | WEIGHT: 229 LBS | SYSTOLIC BLOOD PRESSURE: 108 MMHG

## 2022-03-16 DIAGNOSIS — I10 BENIGN ESSENTIAL HYPERTENSION: ICD-10-CM

## 2022-03-16 DIAGNOSIS — M79.601 PAIN OF RIGHT UPPER EXTREMITY: Primary | ICD-10-CM

## 2022-03-16 DIAGNOSIS — F41.9 ANXIETY: ICD-10-CM

## 2022-03-16 DIAGNOSIS — E03.9 HYPOTHYROIDISM, UNSPECIFIED TYPE: ICD-10-CM

## 2022-03-16 LAB
ANION GAP SERPL CALCULATED.3IONS-SCNC: 6 MMOL/L (ref 3–14)
BUN SERPL-MCNC: 19 MG/DL (ref 7–30)
CALCIUM SERPL-MCNC: 9.7 MG/DL (ref 8.5–10.1)
CHLORIDE BLD-SCNC: 109 MMOL/L (ref 94–109)
CO2 SERPL-SCNC: 26 MMOL/L (ref 20–32)
CREAT SERPL-MCNC: 0.9 MG/DL (ref 0.52–1.04)
GFR SERPL CREATININE-BSD FRML MDRD: 74 ML/MIN/1.73M2
GLUCOSE BLD-MCNC: 93 MG/DL (ref 70–99)
POTASSIUM BLD-SCNC: 4.1 MMOL/L (ref 3.4–5.3)
SODIUM SERPL-SCNC: 141 MMOL/L (ref 133–144)
T4 FREE SERPL-MCNC: 1.51 NG/DL (ref 0.76–1.46)
TSH SERPL DL<=0.005 MIU/L-ACNC: 0.88 MU/L (ref 0.4–4)

## 2022-03-16 PROCEDURE — G0463 HOSPITAL OUTPT CLINIC VISIT: HCPCS | Mod: 25

## 2022-03-16 PROCEDURE — 99213 OFFICE O/P EST LOW 20 MIN: CPT | Performed by: FAMILY MEDICINE

## 2022-03-16 PROCEDURE — 36415 COLL VENOUS BLD VENIPUNCTURE: CPT

## 2022-03-16 PROCEDURE — 84443 ASSAY THYROID STIM HORMONE: CPT

## 2022-03-16 PROCEDURE — G0463 HOSPITAL OUTPT CLINIC VISIT: HCPCS

## 2022-03-16 PROCEDURE — 84439 ASSAY OF FREE THYROXINE: CPT

## 2022-03-16 PROCEDURE — 80048 BASIC METABOLIC PNL TOTAL CA: CPT

## 2022-03-16 RX ORDER — LOSARTAN POTASSIUM AND HYDROCHLOROTHIAZIDE 12.5; 1 MG/1; MG/1
1 TABLET ORAL DAILY
Qty: 90 TABLET | Refills: 1 | Status: SHIPPED | OUTPATIENT
Start: 2022-03-16 | End: 2022-04-13

## 2022-03-16 RX ORDER — LEVOTHYROXINE SODIUM 137 UG/1
137 TABLET ORAL DAILY
Qty: 90 TABLET | Refills: 3 | Status: SHIPPED | OUTPATIENT
Start: 2022-03-16 | End: 2023-02-16

## 2022-03-16 ASSESSMENT — ANXIETY QUESTIONNAIRES: GAD7 TOTAL SCORE: 6

## 2022-03-16 NOTE — PROGRESS NOTES
Jeremiah Trujillo is a 56 year old who presents for the following health issues     HPI   1. HTN  No problems or side effects from medication  Only check bp at home if feel bp is high  Due renal panel    2. Hypothyroid  Continues on levothyroxine 137 mcg daily  No constipation, palpitations, weight changes  No concerns  Due TSH, free T4    3. Anxiety   Increased lately  Mild stressors trigger more anxiety, but able to do cognitive reframing  Not generally disrupting function    4. R arm ache at night, upper arm, worse if push out on on things  Started in December, no trauma  Notes Typeing, phone use as part of job  Abduction worse  Arm tends to fall asleep, but otherwise no numb/tingling weakness    Current Outpatient Medications   Medication     levothyroxine (SYNTHROID/LEVOTHROID) 137 MCG tablet     losartan-hydrochlorothiazide (HYZAAR) 100-12.5 MG tablet     Current Facility-Administered Medications   Medication     lidocaine 1% with EPINEPHrine 1:100,000 injection 3 mL     Review of Systems   Constitutional: Negative.    Respiratory: Negative for shortness of breath.    Cardiovascular: Negative for chest pain and palpitations.        Objective    /75   Pulse 80   Wt 103.9 kg (229 lb)   Breastfeeding No   BMI 40.57 kg/m    Body mass index is 40.57 kg/m .  Physical Exam   EXAM:  Constitutional: healthy, alert and no distress   Neck: nontender, normal ROM, thyroid normal size, no masses, nontender  R Shoulder: nontender, normal ROM  Nontender at triceps, biceps tendon  Speed's test negative  Strength RUE 5/5      A/P  1. HTN, controlled on current regimen  --check BMP  2. Hypothyroid  Clinically doing well, check TSH, free T4  3. R arm pain  Avoid phone use in hand, change sleep position x 2+ weeks  If not improving, will refer to  PT  4. Anxiety,  Continue nondrug management techniques  Follow-up CPE within next 3-4 months      Answers for HPI/ROS submitted by the patient on 3/15/2022  CARLOS 7 TOTAL  SCORE: 6

## 2022-03-16 NOTE — LETTER
Date:April 5, 2022      Patient was self referred, no letter generated. Do not send.        [facility] Health Information

## 2022-03-16 NOTE — LETTER
3/16/2022       RE: Cassandra Burgess  [address] [facility] [address]     Dear Colleague,    Thank you for referring your patient, Cassandra Burgess, to the [facility] [place] at [facility]. Please see a copy of my visit note below.          Jeremiah Trujillo is a 56 year old who presents for the following health issues     HPI   1. HTN  No problems or side effects from medication  Only check bp at home if feel bp is high  Due renal panel    2. Hypothyroid  Continues on levothyroxine 137 mcg daily  No constipation, palpitations, weight changes  No concerns  Due TSH, free T4    3. Anxiety   Increased lately  Mild stressors trigger more anxiety, but able to do cognitive reframing  Not generally disrupting function    4. R arm ache at night, upper arm, worse if push out on on things  Started in December, no trauma  Notes Typeing, phone use as part of job  Abduction worse  Arm tends to fall asleep, but otherwise no numb/tingling weakness    Current Outpatient Medications   Medication     levothyroxine (SYNTHROID/LEVOTHROID) 137 MCG tablet     losartan-hydrochlorothiazide (HYZAAR) 100-12.5 MG tablet     Current Facility-Administered Medications   Medication     lidocaine 1% with EPINEPHrine 1:100,000 injection 3 mL     Review of Systems   Constitutional: Negative.    Respiratory: Negative for shortness of breath.    Cardiovascular: Negative for chest pain and palpitations.        Objective    /75   Pulse 80   Wt 103.9 kg (229 lb)   Breastfeeding No   BMI 40.57 kg/m    Body mass index is 40.57 kg/m .  Physical Exam   EXAM:  Constitutional: healthy, alert and no distress   Neck: nontender, normal ROM, thyroid normal size, no masses, nontender  R Shoulder: nontender, normal ROM  Nontender at triceps, biceps tendon  Speed's test negative  Strength RUE 5/5      A/P  1. HTN, controlled on current regimen  --check BMP  2. Hypothyroid  Clinically doing  well, check TSH, free T4  3. R arm pain  Avoid phone use in hand, change sleep position x 2+ weeks  If not improving, will refer to  PT  4. Anxiety,  Continue nondrug management techniques  Follow-up CPE within next 3-4 months      Answers for HPI/ROS submitted by the patient on 3/15/2022  CARLOS 7 TOTAL SCORE: 6          Again, thank you for allowing me to participate in the care of your patient.      Sincerely,    Roland Narayan MD

## 2022-03-23 NOTE — RESULT ENCOUNTER NOTE
"Dear Cassandra,     Here are your recent results. Your kidney panel is normal. Your thyroid tests are borderline on the \"too much\" side. Let's recheck your levels at  your annual  and see how they are, but not change the dose for now.     Please let us know if you have any questions or concerns.    Regards,  Roland Narayan MD "

## 2022-04-04 ASSESSMENT — ENCOUNTER SYMPTOMS
SHORTNESS OF BREATH: 0
CONSTITUTIONAL NEGATIVE: 1
PALPITATIONS: 0

## 2022-04-08 ENCOUNTER — TRANSFERRED RECORDS (OUTPATIENT)
Dept: HEALTH INFORMATION MANAGEMENT | Facility: CLINIC | Age: 57
End: 2022-04-08
Payer: COMMERCIAL

## 2022-04-13 ENCOUNTER — OFFICE VISIT (OUTPATIENT)
Dept: FAMILY MEDICINE | Facility: CLINIC | Age: 57
End: 2022-04-13
Attending: FAMILY MEDICINE
Payer: COMMERCIAL

## 2022-04-13 ENCOUNTER — LAB (OUTPATIENT)
Dept: LAB | Facility: CLINIC | Age: 57
End: 2022-04-13
Attending: FAMILY MEDICINE
Payer: COMMERCIAL

## 2022-04-13 VITALS
SYSTOLIC BLOOD PRESSURE: 115 MMHG | BODY MASS INDEX: 38.93 KG/M2 | HEIGHT: 64 IN | HEART RATE: 70 BPM | WEIGHT: 228 LBS | DIASTOLIC BLOOD PRESSURE: 74 MMHG

## 2022-04-13 DIAGNOSIS — E03.9 ACQUIRED HYPOTHYROIDISM: ICD-10-CM

## 2022-04-13 DIAGNOSIS — Z13.220 SCREENING, LIPID: ICD-10-CM

## 2022-04-13 DIAGNOSIS — I10 BENIGN ESSENTIAL HYPERTENSION: ICD-10-CM

## 2022-04-13 DIAGNOSIS — Z00.00 ENCOUNTER FOR PREVENTIVE HEALTH EXAMINATION: ICD-10-CM

## 2022-04-13 DIAGNOSIS — E03.9 ACQUIRED HYPOTHYROIDISM: Primary | ICD-10-CM

## 2022-04-13 LAB
CHOLEST SERPL-MCNC: 220 MG/DL
FASTING STATUS PATIENT QL REPORTED: NO
HDLC SERPL-MCNC: 52 MG/DL
LDLC SERPL CALC-MCNC: 135 MG/DL
NONHDLC SERPL-MCNC: 168 MG/DL
T4 FREE SERPL-MCNC: 1.37 NG/DL (ref 0.76–1.46)
TRIGL SERPL-MCNC: 165 MG/DL
TSH SERPL DL<=0.005 MIU/L-ACNC: 1.51 MU/L (ref 0.4–4)

## 2022-04-13 PROCEDURE — 84443 ASSAY THYROID STIM HORMONE: CPT

## 2022-04-13 PROCEDURE — 90471 IMMUNIZATION ADMIN: CPT

## 2022-04-13 PROCEDURE — 250N000021 HC RX MED A9270 GY (STAT IND- M) 250

## 2022-04-13 PROCEDURE — G0463 HOSPITAL OUTPT CLINIC VISIT: HCPCS | Mod: 25 | Performed by: FAMILY MEDICINE

## 2022-04-13 PROCEDURE — 80061 LIPID PANEL: CPT

## 2022-04-13 PROCEDURE — 36415 COLL VENOUS BLD VENIPUNCTURE: CPT

## 2022-04-13 PROCEDURE — 90750 HZV VACC RECOMBINANT IM: CPT

## 2022-04-13 PROCEDURE — 99396 PREV VISIT EST AGE 40-64: CPT | Performed by: FAMILY MEDICINE

## 2022-04-13 PROCEDURE — 84439 ASSAY OF FREE THYROXINE: CPT

## 2022-04-13 RX ORDER — LOSARTAN POTASSIUM AND HYDROCHLOROTHIAZIDE 12.5; 1 MG/1; MG/1
1 TABLET ORAL DAILY
Qty: 90 TABLET | Refills: 1 | Status: SHIPPED | OUTPATIENT
Start: 2022-04-13 | End: 2023-02-15

## 2022-04-13 ASSESSMENT — ANXIETY QUESTIONNAIRES
2. NOT BEING ABLE TO STOP OR CONTROL WORRYING: SEVERAL DAYS
4. TROUBLE RELAXING: NOT AT ALL
6. BECOMING EASILY ANNOYED OR IRRITABLE: SEVERAL DAYS
GAD7 TOTAL SCORE: 4
GAD7 TOTAL SCORE: 4
7. FEELING AFRAID AS IF SOMETHING AWFUL MIGHT HAPPEN: NOT AT ALL
5. BEING SO RESTLESS THAT IT IS HARD TO SIT STILL: NOT AT ALL
1. FEELING NERVOUS, ANXIOUS, OR ON EDGE: SEVERAL DAYS
3. WORRYING TOO MUCH ABOUT DIFFERENT THINGS: SEVERAL DAYS
7. FEELING AFRAID AS IF SOMETHING AWFUL MIGHT HAPPEN: NOT AT ALL

## 2022-04-13 ASSESSMENT — ENCOUNTER SYMPTOMS
HALLUCINATIONS: 0
EYE IRRITATION: 1
EYE REDNESS: 0
DECREASED CONCENTRATION: 1
EYE WATERING: 1
ALTERED TEMPERATURE REGULATION: 1
EYE PAIN: 1
POLYDIPSIA: 0
DEPRESSION: 0
POOR WOUND HEALING: 0
PANIC: 0
POLYPHAGIA: 0
CHILLS: 1
INSOMNIA: 1
INCREASED ENERGY: 0
DECREASED APPETITE: 0
NIGHT SWEATS: 0
NERVOUS/ANXIOUS: 1
FEVER: 0
FATIGUE: 1
WEIGHT GAIN: 0
NAIL CHANGES: 0
SKIN CHANGES: 0
DOUBLE VISION: 0

## 2022-04-13 NOTE — LETTER
4/13/2022       RE: Cassandra Burgess  [address] [facility] [address]     Dear Colleague,    Thank you for referring your patient, Cassandra Burgess, to the [facility] [place] at [facility]. Please see a copy of my visit note below.    Chief Complaint   Patient presents with     Physical     Annual exam     Answers for HPI/ROS submitted by the patient on 4/13/2022  CARLOS 7 TOTAL SCORE: 4  General Symptoms: Yes  Skin Symptoms: Yes  HENT Symptoms: No  EYE SYMPTOMS: Yes  HEART SYMPTOMS: No  LUNG SYMPTOMS: No  INTESTINAL SYMPTOMS: No  URINARY SYMPTOMS: No  GYNECOLOGIC SYMPTOMS: No  BREAST SYMPTOMS: No  SKELETAL SYMPTOMS: No  BLOOD SYMPTOMS: No  NERVOUS SYSTEM SYMPTOMS: No  MENTAL HEALTH SYMPTOMS: Yes  Fever: No  Loss of appetite: No  Weight gain: No  Fatigue: Yes  Night sweats: No  Chills: Yes  Increased stress: Yes  Excessive hunger: No  Excessive thirst: No  Feeling hot or cold when others believe the temperature is normal: Yes  Loss of height: No  Post-operative complications: No  Surgical site pain: No  Hallucinations: No  Change in or Loss of Energy: No  Hyperactivity: No  Confusion: No  Changes in hair: No  Changes in moles/birth marks: No  Itching: Yes  Rashes: Yes  Changes in nails: No  Acne: No  Hair in places you don't want it: No  Change in facial hair: No  Warts: No  Non-healing sores: No  Scarring: No  Flaking of skin: No  Color changes of hands/feet in cold : No  Sun sensitivity: No  Skin thickening: No  Eye pain: Yes  Vision loss: No  Dry eyes: Yes  Watery eyes: Yes  Eye bulging: No  Double vision: No  Flashing of lights: No  Spots: No  Floaters: Yes  Redness: No  Crossed eyes: No  Tunnel Vision: No  Yellowing of eyes: No  Eye irritation: Yes  Nervous or Anxious: Yes  Depression: No  Trouble sleeping: Yes  Trouble thinking or concentrating: Yes  Mood changes: Yes  Panic attacks: No        Pt. Here for CPE      Chronic conditions  1.  GERD--doing well   2. Hypothyroid--Continues on 137 mcg levothyroxine-- free T4 a little high 4 weeks ago, repeat  3 . Depression and anxiety--in remission, off medication,   4. HTN--on losartan-hydrochlorothiazide 100/12.5 mg, controlled, no side effect,s, BMP done 3/2022  5. Retinal/epiretina membraine--continues with specialist, may need additional procedue     Preventive Care  4. Breast--no other concerns, due mammogram, last mammogram 2021. Prefers every 2 year screening.  all normal in past. Average risk. Repeat 2023  .  5. Gyne-- LMP-- approx, s/p hysterectomy for fibroids, ovaries left. Menses regular prior to that. Paps normal except right after childbearing, with normal colposcopy. No night sweats..Signs of menopause in early 50's..  (twins) Vaginal with 1 breech (flipped). Near 7 and 8 #. (children age 25+ now).   Vaginal symptoms: none   Urinary: Some urinary leaking when cough/sneeze, tolerable.     (x 4 yrs,  x 25 years) Currently sexually active with male partner, monogamous, no condoms use.  No sexual concerns,   No hx STI ( did have herpes). Started hep B vaccine 2 out of 3  HIV testing in past? 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     Hep C negative.      5. Bone--eat dairy, some. Calcium supplement 2/wk. 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 Use sunlamp in winter.   No fhx osteoporosis        6. Immunizations  Need Covid booster--moderna  Need Shingrex #2       7. Colon cancer screen   colonsocopy normal , follow-up 10 years    8. CV   lipids 2017  HDL 72,---due for repeat, ate fiber bar/coffee with creamer   glucose  93              Current Outpatient Medications   Medication     levothyroxine (SYNTHROID/LEVOTHROID) 137 MCG tablet     losartan (COZAAR) 50 MG  tablet     Calcium-Magnesium-Vitamin D (CALCIUM MAGNESIUM PO)     Cholecalciferol (VITAMIN D3 PO)     Multiple Vitamins-Minerals (MULTIVITAMIN PO)     Omega-3 Fatty Acids (OMEGA-3 FISH OIL PO)     vitamin  B complex with vitamin C (VITAMIN  B COMPLEX) TABS            Current Facility-Administered Medications   Medication     lidocaine 1% with EPINEPHrine 1:100,000 injection 3 mL             PMH  Hysterectomy           Family History                Family History   Problem Relation Age of Onset     Diabetes Maternal Grandmother        Depression Sister        Depression Sister        Multiple Sclerosis Brother        Breast Cancer Niece        Anxiety Disorder Daughter        Depression Sister        Depression Sister        Genetic Disorder Nephew            cystic fibrosis     Thyroid Disease Sister              Mother--well  Father--d. Lung cancer  Mat grandfather--bipolar, mat uncle bipolar (both suicide)  No changes since 2021     SH  Standard diet  ETOH--1/week, 1/sitting at most  No tobacco  Activity above    Answers for HPI/ROS submitted by the patient on 4/13/2022  CARLOS 7 TOTAL SCORE: 4  General Symptoms: Yes  Skin Symptoms: Yes  HENT Symptoms: No  EYE SYMPTOMS: Yes  HEART SYMPTOMS: No  LUNG SYMPTOMS: No  INTESTINAL SYMPTOMS: No  URINARY SYMPTOMS: No  GYNECOLOGIC SYMPTOMS: No  BREAST SYMPTOMS: No  SKELETAL SYMPTOMS: No  BLOOD SYMPTOMS: No  NERVOUS SYSTEM SYMPTOMS: No  MENTAL HEALTH SYMPTOMS: Yes  Fever: No  Loss of appetite: No  Weight gain: No  Fatigue: Yes  Night sweats: No  Chills: Yes  Increased stress: Yes  Excessive hunger: No  Excessive thirst: No  Feeling hot or cold when others believe the temperature is normal: Yes--occ. Feeling chilled in past month  Loss of height: No  Post-operative complications: No  Surgical site pain: No  Hallucinations: No  Change in or Loss of Energy: No  Hyperactivity: No  Confusion: No  Changes in hair: No  Changes in moles/birth marks: No  Itching: Yes  Rashes: Yes--last  "2weeks on back, dots on back then stomach  Changes in nails: No  Acne: No  Hair in places you don't want it: No  Change in facial hair: No  Warts: No  Non-healing sores: No  Scarring: No  Flaking of skin: No  Color changes of hands/feet in cold : No  Sun sensitivity: No  Skin thickening: No  Eye pain: Yes  Vision loss: No  Dry eyes: Yes  Watery eyes: Yes  Eye bulging: No  Double vision: No  Flashing of lights: No  Spots: No  Floaters: Yes  Redness: No  Crossed eyes: No  Tunnel Vision: No  Yellowing of eyes: No  Eye irritation: Yes  Nervous or Anxious: Yes  Depression: No  Trouble sleeping: Yes  Trouble thinking or concentrating: Yes  Mood changes: Yes  Panic attacks: No    CARLOS-7 SCORE 5/27/2021 3/15/2022 4/13/2022   Total Score 1 (minimal anxiety) 6 (mild anxiety) 4 (minimal anxiety)   Total Score 1 6 4     PE  Blood pressure 115/74, pulse 70, height 1.626 m (5' 4\"), weight 103.4 kg (228 lb), not currently breastfeeding.  EXAM:  Constitutional: healthy, alert and no distress   Cardiovascular: negative, PMI normal. No lifts, heaves, or thrills. RRR. No murmurs, clicks gallops or rub  Respiratory: negative, Percussion normal. Good diaphragmatic excursion. Lungs clear  Psychiatric: mentation appears normal and affect normal/bright  Neck: Neck supple. No adenopathy. Thyroid symmetric, normal size,, Carotids without bruits.  ENT: ENT exam normal, no neck nodes or sinus tenderness  Abdomen: Abdomen soft, non-tender. BS normal. No masses, organomegaly  NEURO: Gait normal. Reflexes normal and symmetric. Sensation grossly WNL., negative findings: cranial nerves 2-12 intact, muscle strength normal, reflexes normal and symmetric  LYMPH: Normal cervical lymph nodes  JOINT/EXTREMITIES: extremities normal- no gross deformities noted, gait normal and normal muscle tone  Skin Scattered papules mid back and mid abdomen bilaterally, pink-red, with fine scale, consistent with pityriasis rosasea    A/P  1. HCM  Immunizations-- Shingrex " today, recommend Moderna Covid booster    Cancer screening--Up to date  Reviewed above      CV risk- due for lipid screening        Bone Health--  Continue adequate weight bearing activity and Vit D intake, counseling given      STI/HIV Screening --Up to Date      Contraception--not needed    2. Pityriasis rosacea  Counseled patient on natural history of condition, will resolve on own over time frame of weeks to months    3. Hypothyroid--due TSH, free T4  4. HTN, controlled.      Follow-up 6 months        Again, thank you for allowing me to participate in the care of your patient.      Sincerely,    Roland Narayan MD

## 2022-04-13 NOTE — PROGRESS NOTES
Pt. Here for CPE      Chronic conditions  1. GERD--doing well   2. Hypothyroid--Continues on 137 mcg levothyroxine-- free T4 a little high 4 weeks ago, repeat  3 . Depression and anxiety--in remission, off medication,   4. HTN--on losartan-hydrochlorothiazide 100/12.5 mg, controlled, no side effect,s, BMP done 3/2022  5. Retinal/epiretina membraine--continues with specialist, may need additional procedue     Preventive Care  4. Breast--no other concerns, due mammogram, last mammogram 2021. Prefers every 2 year screening.  all normal in past. Average risk. Repeat 2023  .  5. Gyne-- LMP-- approx, s/p hysterectomy for fibroids, ovaries left. Menses regular prior to that. Paps normal except right after childbearing, with normal colposcopy. No night sweats..Signs of menopause in early 50's..  (twins) Vaginal with 1 breech (flipped). Near 7 and 8 #. (children age 25+ now).   Vaginal symptoms: none   Urinary: Some urinary leaking when cough/sneeze, tolerable.     (x 4 yrs,  x 25 years) Currently sexually active with male partner, monogamous, no condoms use.  No sexual concerns,   No hx STI ( did have herpes). Started hep B vaccine 2 out of 3  HIV testing in past? 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     Hep C negative.      5. Bone--eat dairy, some. Calcium supplement 2/wk. 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 Use sunlamp in winter.   No fhx osteoporosis        6. Immunizations  Need Covid booster--moderna  Need Shingrex #2       7. Colon cancer screen   colonsocopy normal , follow-up 10 years    8. CV   lipids 2017  HDL 72,---due for repeat, ate fiber bar/coffee with creamer   glucose  93              Current Outpatient Medications   Medication     levothyroxine (SYNTHROID/LEVOTHROID)  137 MCG tablet     losartan (COZAAR) 50 MG tablet     Calcium-Magnesium-Vitamin D (CALCIUM MAGNESIUM PO)     Cholecalciferol (VITAMIN D3 PO)     Multiple Vitamins-Minerals (MULTIVITAMIN PO)     Omega-3 Fatty Acids (OMEGA-3 FISH OIL PO)     vitamin  B complex with vitamin C (VITAMIN  B COMPLEX) TABS            Current Facility-Administered Medications   Medication     lidocaine 1% with EPINEPHrine 1:100,000 injection 3 mL             PMH  Hysterectomy           Family History                Family History   Problem Relation Age of Onset     Diabetes Maternal Grandmother        Depression Sister        Depression Sister        Multiple Sclerosis Brother        Breast Cancer Niece        Anxiety Disorder Daughter        Depression Sister        Depression Sister        Genetic Disorder Nephew            cystic fibrosis     Thyroid Disease Sister              Mother--well  Father--d. Lung cancer  Mat grandfather--bipolar, mat uncle bipolar (both suicide)  No changes since 2021     SH  Standard diet  ETOH--1/week, 1/sitting at most  No tobacco  Activity above    Answers for HPI/ROS submitted by the patient on 4/13/2022  CARLOS 7 TOTAL SCORE: 4  General Symptoms: Yes  Skin Symptoms: Yes  HENT Symptoms: No  EYE SYMPTOMS: Yes  HEART SYMPTOMS: No  LUNG SYMPTOMS: No  INTESTINAL SYMPTOMS: No  URINARY SYMPTOMS: No  GYNECOLOGIC SYMPTOMS: No  BREAST SYMPTOMS: No  SKELETAL SYMPTOMS: No  BLOOD SYMPTOMS: No  NERVOUS SYSTEM SYMPTOMS: No  MENTAL HEALTH SYMPTOMS: Yes  Fever: No  Loss of appetite: No  Weight gain: No  Fatigue: Yes  Night sweats: No  Chills: Yes  Increased stress: Yes  Excessive hunger: No  Excessive thirst: No  Feeling hot or cold when others believe the temperature is normal: Yes--occ. Feeling chilled in past month  Loss of height: No  Post-operative complications: No  Surgical site pain: No  Hallucinations: No  Change in or Loss of Energy: No  Hyperactivity: No  Confusion: No  Changes in hair: No  Changes in moles/birth  "marks: No  Itching: Yes  Rashes: Yes--last 2weeks on back, dots on back then stomach  Changes in nails: No  Acne: No  Hair in places you don't want it: No  Change in facial hair: No  Warts: No  Non-healing sores: No  Scarring: No  Flaking of skin: No  Color changes of hands/feet in cold : No  Sun sensitivity: No  Skin thickening: No  Eye pain: Yes  Vision loss: No  Dry eyes: Yes  Watery eyes: Yes  Eye bulging: No  Double vision: No  Flashing of lights: No  Spots: No  Floaters: Yes  Redness: No  Crossed eyes: No  Tunnel Vision: No  Yellowing of eyes: No  Eye irritation: Yes  Nervous or Anxious: Yes  Depression: No  Trouble sleeping: Yes  Trouble thinking or concentrating: Yes  Mood changes: Yes  Panic attacks: No    CARLOS-7 SCORE 5/27/2021 3/15/2022 4/13/2022   Total Score 1 (minimal anxiety) 6 (mild anxiety) 4 (minimal anxiety)   Total Score 1 6 4     PE  Blood pressure 115/74, pulse 70, height 1.626 m (5' 4\"), weight 103.4 kg (228 lb), not currently breastfeeding.  EXAM:  Constitutional: healthy, alert and no distress   Cardiovascular: negative, PMI normal. No lifts, heaves, or thrills. RRR. No murmurs, clicks gallops or rub  Respiratory: negative, Percussion normal. Good diaphragmatic excursion. Lungs clear  Psychiatric: mentation appears normal and affect normal/bright  Neck: Neck supple. No adenopathy. Thyroid symmetric, normal size,, Carotids without bruits.  ENT: ENT exam normal, no neck nodes or sinus tenderness  Abdomen: Abdomen soft, non-tender. BS normal. No masses, organomegaly  NEURO: Gait normal. Reflexes normal and symmetric. Sensation grossly WNL., negative findings: cranial nerves 2-12 intact, muscle strength normal, reflexes normal and symmetric  LYMPH: Normal cervical lymph nodes  JOINT/EXTREMITIES: extremities normal- no gross deformities noted, gait normal and normal muscle tone  Skin Scattered papules mid back and mid abdomen bilaterally, pink-red, with fine scale, consistent with pityriasis " tramaine    A/P  1. HCM  Immunizations-- Shingrex today, recommend Moderna Covid booster    Cancer screening--Up to date  Reviewed above      CV risk- due for lipid screening        Bone Health--  Continue adequate weight bearing activity and Vit D intake, counseling given      STI/HIV Screening --Up to Date      Contraception--not needed    2. Pityriasis rosacea  Counseled patient on natural history of condition, will resolve on own over time frame of weeks to months    3. Hypothyroid--due TSH, free T4  4. HTN, controlled.      Follow-up 6 months

## 2022-04-13 NOTE — PROGRESS NOTES
Chief Complaint   Patient presents with     Physical     Annual exam     Answers for HPI/ROS submitted by the patient on 4/13/2022  CARLOS 7 TOTAL SCORE: 4  General Symptoms: Yes  Skin Symptoms: Yes  HENT Symptoms: No  EYE SYMPTOMS: Yes  HEART SYMPTOMS: No  LUNG SYMPTOMS: No  INTESTINAL SYMPTOMS: No  URINARY SYMPTOMS: No  GYNECOLOGIC SYMPTOMS: No  BREAST SYMPTOMS: No  SKELETAL SYMPTOMS: No  BLOOD SYMPTOMS: No  NERVOUS SYSTEM SYMPTOMS: No  MENTAL HEALTH SYMPTOMS: Yes  Fever: No  Loss of appetite: No  Weight gain: No  Fatigue: Yes  Night sweats: No  Chills: Yes  Increased stress: Yes  Excessive hunger: No  Excessive thirst: No  Feeling hot or cold when others believe the temperature is normal: Yes  Loss of height: No  Post-operative complications: No  Surgical site pain: No  Hallucinations: No  Change in or Loss of Energy: No  Hyperactivity: No  Confusion: No  Changes in hair: No  Changes in moles/birth marks: No  Itching: Yes  Rashes: Yes  Changes in nails: No  Acne: No  Hair in places you don't want it: No  Change in facial hair: No  Warts: No  Non-healing sores: No  Scarring: No  Flaking of skin: No  Color changes of hands/feet in cold : No  Sun sensitivity: No  Skin thickening: No  Eye pain: Yes  Vision loss: No  Dry eyes: Yes  Watery eyes: Yes  Eye bulging: No  Double vision: No  Flashing of lights: No  Spots: No  Floaters: Yes  Redness: No  Crossed eyes: No  Tunnel Vision: No  Yellowing of eyes: No  Eye irritation: Yes  Nervous or Anxious: Yes  Depression: No  Trouble sleeping: Yes  Trouble thinking or concentrating: Yes  Mood changes: Yes  Panic attacks: No

## 2022-04-13 NOTE — LETTER
Date:April 28, 2022      Patient was self referred, no letter generated. Do not send.        [facility] Health Information

## 2022-04-14 ASSESSMENT — ANXIETY QUESTIONNAIRES: GAD7 TOTAL SCORE: 4

## 2022-05-02 NOTE — RESULT ENCOUNTER NOTE
Dear Cassandra,     Here are your recent results. Your thyroid labs are normal, and your cholesterol panel is about the same as last check. You have a 3.6% 10-year risk of heart disease or stroke, so even though your cholesterol is mildly high, no need to start a medication    Please let us know if you have any questions or concerns.    Regards,  Roland Narayan MD

## 2022-09-11 ENCOUNTER — HEALTH MAINTENANCE LETTER (OUTPATIENT)
Age: 57
End: 2022-09-11

## 2023-02-15 DIAGNOSIS — I10 BENIGN ESSENTIAL HYPERTENSION: ICD-10-CM

## 2023-02-15 RX ORDER — LOSARTAN POTASSIUM AND HYDROCHLOROTHIAZIDE 12.5; 1 MG/1; MG/1
1 TABLET ORAL DAILY
Qty: 90 TABLET | Refills: 1 | Status: SHIPPED | OUTPATIENT
Start: 2023-02-15 | End: 2023-02-16

## 2023-02-15 ASSESSMENT — ENCOUNTER SYMPTOMS
HEADACHES: 0
DIZZINESS: 0
NERVOUS/ANXIOUS: 0
DIARRHEA: 0
FEVER: 0
CONSTIPATION: 0
JOINT SWELLING: 0
CHILLS: 0
COUGH: 0
SORE THROAT: 0
ARTHRALGIAS: 0
FREQUENCY: 0
ABDOMINAL PAIN: 0
HEMATURIA: 0
WEAKNESS: 0
SHORTNESS OF BREATH: 0
DYSURIA: 0
NAUSEA: 0
HEMATOCHEZIA: 0
HEARTBURN: 0
MYALGIAS: 0
EYE PAIN: 0
PALPITATIONS: 0
BREAST MASS: 0
PARESTHESIAS: 0

## 2023-02-16 ENCOUNTER — OFFICE VISIT (OUTPATIENT)
Dept: FAMILY MEDICINE | Facility: CLINIC | Age: 58
End: 2023-02-16
Payer: COMMERCIAL

## 2023-02-16 VITALS
OXYGEN SATURATION: 96 % | BODY MASS INDEX: 40.75 KG/M2 | SYSTOLIC BLOOD PRESSURE: 128 MMHG | TEMPERATURE: 97.9 F | HEIGHT: 63 IN | DIASTOLIC BLOOD PRESSURE: 80 MMHG | HEART RATE: 77 BPM | WEIGHT: 230 LBS

## 2023-02-16 DIAGNOSIS — E66.01 MORBID OBESITY (H): ICD-10-CM

## 2023-02-16 DIAGNOSIS — Z12.4 CERVICAL CANCER SCREENING: ICD-10-CM

## 2023-02-16 DIAGNOSIS — I10 BENIGN ESSENTIAL HYPERTENSION: ICD-10-CM

## 2023-02-16 DIAGNOSIS — Z00.00 ROUTINE GENERAL MEDICAL EXAMINATION AT A HEALTH CARE FACILITY: Primary | ICD-10-CM

## 2023-02-16 DIAGNOSIS — E03.9 HYPOTHYROIDISM, UNSPECIFIED TYPE: ICD-10-CM

## 2023-02-16 LAB
ALBUMIN SERPL BCG-MCNC: 4.3 G/DL (ref 3.5–5.2)
ALP SERPL-CCNC: 81 U/L (ref 35–104)
ALT SERPL W P-5'-P-CCNC: 48 U/L (ref 10–35)
ANION GAP SERPL CALCULATED.3IONS-SCNC: 9 MMOL/L (ref 7–15)
AST SERPL W P-5'-P-CCNC: 31 U/L (ref 10–35)
BILIRUB SERPL-MCNC: 0.5 MG/DL
BUN SERPL-MCNC: 20.3 MG/DL (ref 8–23)
CALCIUM SERPL-MCNC: 9.3 MG/DL (ref 8.6–10)
CHLORIDE SERPL-SCNC: 106 MMOL/L (ref 98–107)
CHOLEST SERPL-MCNC: 203 MG/DL
CREAT SERPL-MCNC: 0.83 MG/DL (ref 0.51–0.95)
DEPRECATED HCO3 PLAS-SCNC: 27 MMOL/L (ref 22–29)
ERYTHROCYTE [DISTWIDTH] IN BLOOD BY AUTOMATED COUNT: 12.5 % (ref 10–15)
GFR SERPL CREATININE-BSD FRML MDRD: 81 ML/MIN/1.73M2
GLUCOSE SERPL-MCNC: 93 MG/DL (ref 70–99)
HCT VFR BLD AUTO: 43.1 % (ref 35–47)
HDLC SERPL-MCNC: 60 MG/DL
HGB BLD-MCNC: 14.3 G/DL (ref 11.7–15.7)
LDLC SERPL CALC-MCNC: 114 MG/DL
MCH RBC QN AUTO: 30.1 PG (ref 26.5–33)
MCHC RBC AUTO-ENTMCNC: 33.2 G/DL (ref 31.5–36.5)
MCV RBC AUTO: 91 FL (ref 78–100)
NONHDLC SERPL-MCNC: 143 MG/DL
PLATELET # BLD AUTO: 255 10E3/UL (ref 150–450)
POTASSIUM SERPL-SCNC: 4 MMOL/L (ref 3.4–5.3)
PROT SERPL-MCNC: 7.2 G/DL (ref 6.4–8.3)
RBC # BLD AUTO: 4.75 10E6/UL (ref 3.8–5.2)
SODIUM SERPL-SCNC: 142 MMOL/L (ref 136–145)
TRIGL SERPL-MCNC: 146 MG/DL
TSH SERPL DL<=0.005 MIU/L-ACNC: 0.3 UIU/ML (ref 0.3–4.2)
WBC # BLD AUTO: 4.9 10E3/UL (ref 4–11)

## 2023-02-16 PROCEDURE — 36415 COLL VENOUS BLD VENIPUNCTURE: CPT | Performed by: NURSE PRACTITIONER

## 2023-02-16 PROCEDURE — 84443 ASSAY THYROID STIM HORMONE: CPT | Performed by: NURSE PRACTITIONER

## 2023-02-16 PROCEDURE — 85027 COMPLETE CBC AUTOMATED: CPT | Performed by: NURSE PRACTITIONER

## 2023-02-16 PROCEDURE — 80053 COMPREHEN METABOLIC PANEL: CPT | Performed by: NURSE PRACTITIONER

## 2023-02-16 PROCEDURE — 99396 PREV VISIT EST AGE 40-64: CPT | Performed by: NURSE PRACTITIONER

## 2023-02-16 PROCEDURE — 80061 LIPID PANEL: CPT | Performed by: NURSE PRACTITIONER

## 2023-02-16 RX ORDER — LOSARTAN POTASSIUM AND HYDROCHLOROTHIAZIDE 12.5; 1 MG/1; MG/1
1 TABLET ORAL DAILY
Qty: 90 TABLET | Refills: 3 | Status: SHIPPED | OUTPATIENT
Start: 2023-02-16

## 2023-02-16 RX ORDER — LISINOPRIL 10 MG/1
10 TABLET ORAL EVERY MORNING
Qty: 30 TABLET | Refills: 0 | Status: CANCELLED | OUTPATIENT
Start: 2023-02-16

## 2023-02-16 RX ORDER — LEVOTHYROXINE SODIUM 137 UG/1
137 TABLET ORAL DAILY
Qty: 90 TABLET | Refills: 3 | Status: SHIPPED | OUTPATIENT
Start: 2023-02-16

## 2023-02-16 ASSESSMENT — ENCOUNTER SYMPTOMS
CONSTIPATION: 0
DIZZINESS: 0
BREAST MASS: 0
NERVOUS/ANXIOUS: 0
SHORTNESS OF BREATH: 0
PALPITATIONS: 0
FREQUENCY: 0
DIARRHEA: 0
DYSURIA: 0
FEVER: 0
HEARTBURN: 0
PARESTHESIAS: 0
ABDOMINAL PAIN: 0
HEMATURIA: 0
COUGH: 0
JOINT SWELLING: 0
MYALGIAS: 0
CHILLS: 0
NAUSEA: 0
HEADACHES: 0
WEAKNESS: 0
ARTHRALGIAS: 0
HEMATOCHEZIA: 0
EYE PAIN: 0
SORE THROAT: 0

## 2023-02-16 ASSESSMENT — PAIN SCALES - GENERAL: PAINLEVEL: NO PAIN (0)

## 2023-02-16 NOTE — PROGRESS NOTES
SUBJECTIVE:   CC: Csasandra is an 57 year old who presents for preventive health visit.   Patient has been advised of split billing requirements and indicates understanding: Yes  Healthy Habits:     Getting at least 3 servings of Calcium per day:  NO    Bi-annual eye exam:  Yes    Dental care twice a year:  Yes    Sleep apnea or symptoms of sleep apnea:  Daytime drowsiness    Diet:  Regular (no restrictions)    Frequency of exercise:  None    Taking medications regularly:  Yes    Medication side effects:  None    PHQ-2 Total Score: 0    Additional concerns today:  No          Today's PHQ-2 Score:   PHQ-2 ( 1999 [employer]) 2/15/2023   Q1: Little interest or pleasure in doing things 0   Q2: Feeling down, depressed or hopeless 0   PHQ-2 Score 0   PHQ-2 Total Score (12-17 Years)- Positive if 3 or more points; Administer PHQ-A if positive -   Q1: Little interest or pleasure in doing things Not at all   Q2: Feeling down, depressed or hopeless Not at all   PHQ-2 Score 0       Have you ever done Advance Care Planning? (For example, a Health Directive, POLST, or a discussion with a medical provider or your loved ones about your wishes):     Social History     Tobacco Use     Smoking status: Never     Smokeless tobacco: Never   Substance Use Topics     Alcohol use: Yes     Comment: very little     If you drink alcohol do you typically have >3 drinks per day or >7 drinks per week? No    Alcohol Use 2/15/2023   Prescreen: >3 drinks/day or >7 drinks/week? No       Reviewed orders with patient.  Reviewed health maintenance and updated orders accordingly - Yes  BP Readings from Last 3 Encounters:   02/16/23 128/80   04/13/22 115/74   03/16/22 108/75    Wt Readings from Last 3 Encounters:   02/16/23 104.3 kg (230 lb)   04/13/22 103.4 kg (228 lb)   03/16/22 103.9 kg (229 lb)                  Patient Active Problem List   Diagnosis     Depression with anxiety     Acquired hypothyroidism     Menopausal syndrome (hot flashes)     S/P  complete hysterectomy     Obstructive sleep apnea syndrome     Benign essential hypertension     SK (seborrheic keratosis)     Cherry angioma     Solar lentiginosis     Neoplasm of uncertain behavior of skin     Morbid obesity (H)     History of repair of retinal tear by laser photocoagulation     Past Surgical History:   Procedure Laterality Date     BIOPSY       COLONOSCOPY  2014    no problems     EYE SURGERY  2019    Retina detachmentrigt, tear left cataract right     GYN SURGERY  2004    hysterectomy fibrous tumors     HYSTERECTOMY  2004       Social History     Tobacco Use     Smoking status: Never     Smokeless tobacco: Never   Substance Use Topics     Alcohol use: Yes     Comment: very little     Family History   Problem Relation Age of Onset     Diabetes Maternal Grandmother      Depression Sister      Depression Sister      Multiple Sclerosis Brother      Breast Cancer Niece      Anxiety Disorder Daughter      Depression Sister      Depression Sister      Genetic Disorder Nephew         cystic fibrosis     Thyroid Disease Sister      Breast Cancer Niece         currently cancer free         Current Outpatient Medications   Medication Sig Dispense Refill     levothyroxine (SYNTHROID/LEVOTHROID) 137 MCG tablet Take 1 tablet (137 mcg) by mouth daily 90 tablet 3     losartan-hydrochlorothiazide (HYZAAR) 100-12.5 MG tablet Take 1 tablet by mouth daily 90 tablet 3     No Known Allergies  Recent Labs   Lab Test 04/13/22  1102 03/16/22  1034 02/24/21  1429 02/22/20  1057 10/20/18  1047 03/01/17  1225   *  --   --   --   --  137*   HDL 52  --   --   --   --  72   TRIG 165*  --   --   --   --  110   CR  --  0.90 0.86 0.86   < >  --    GFRESTIMATED  --  74 74 75   < >  --    GFRESTBLACK  --   --  86 87   < >  --    POTASSIUM  --  4.1 4.4 4.1   < >  --    TSH 1.51 0.88 0.88 0.40   < > 1.14    < > = values in this interval not displayed.        Breast Cancer Screening:    Breast CA Risk Assessment (FHS-7)  2/15/2023   Do you have a family history of breast, colon, or ovarian cancer? No / Unknown         Mammogram Screening: Recommended mammography every 1-2 years with patient discussion and risk factor consideration  Pertinent mammograms are reviewed under the imaging tab.    History of abnormal Pap smear: Status post benign hysterectomy. Health Maintenance and Surgical History updated.     Reviewed and updated as needed this visit by clinical staff                  Reviewed and updated as needed this visit by Provider                 Past Medical History:   Diagnosis Date     Abnormal Pap smear     copascope found no concerns     Depression 2016     Hypertension      Hypothyroid      Varicella     kid      Past Surgical History:   Procedure Laterality Date     BIOPSY       COLONOSCOPY      no problems     EYE SURGERY  2019    Retina detachmentrigt, tear left cataract right     GYN SURGERY      hysterectomy fibrous tumors     HYSTERECTOMY       OB History    Para Term  AB Living   1 1 1 0 0 1   SAB IAB Ectopic Multiple Live Births   0 0 0 0 0      # Outcome Date GA Lbr Etienne/2nd Weight Sex Delivery Anes PTL Lv   1 Term                Review of Systems   Constitutional: Negative for chills and fever.   HENT: Negative for congestion, ear pain, hearing loss and sore throat.    Eyes: Negative for pain and visual disturbance.   Respiratory: Negative for cough and shortness of breath.    Cardiovascular: Negative for chest pain, palpitations and peripheral edema.   Gastrointestinal: Negative for abdominal pain, constipation, diarrhea, heartburn, hematochezia and nausea.   Breasts:  Negative for tenderness, breast mass and discharge.   Genitourinary: Negative for dysuria, frequency, genital sores, hematuria, pelvic pain, urgency, vaginal bleeding and vaginal discharge.   Musculoskeletal: Negative for arthralgias, joint swelling and myalgias.   Skin: Negative for rash.   Neurological: Negative  "for dizziness, weakness, headaches and paresthesias.   Psychiatric/Behavioral: Negative for mood changes. The patient is not nervous/anxious.           OBJECTIVE:   /80 (BP Location: Right arm, Cuff Size: Adult Large)   Pulse 77   Temp 97.9  F (36.6  C) (Tympanic)   Ht 1.607 m (5' 3.25\")   Wt 104.3 kg (230 lb)   SpO2 96%   BMI 40.42 kg/m    Physical Exam  GENERAL: healthy, alert and no distress  EYES: Eyes grossly normal to inspection, PERRL and conjunctivae and sclerae normal  HENT: ear canals and TM's normal, nose and mouth without ulcers or lesions  NECK: no adenopathy, no asymmetry, masses, or scars and thyroid normal to palpation  RESP: lungs clear to auscultation - no rales, rhonchi or wheezes  BREAST: normal without masses, tenderness or nipple discharge and no palpable axillary masses or adenopathy  CV: regular rate and rhythm, normal S1 S2, no S3 or S4, no murmur, click or rub, no peripheral edema and peripheral pulses strong  ABDOMEN: soft, nontender, no hepatosplenomegaly, no masses and bowel sounds normal  : Normal external genital and normal cervix   MS: no gross musculoskeletal defects noted, no edema  SKIN: no suspicious lesions or rashes  NEURO: Normal strength and tone, mentation intact and speech normal  PSYCH: mentation appears normal, affect normal/bright    Diagnostic Test Results:  Labs reviewed in Epic  Results for orders placed or performed in visit on 02/16/23   CBC with platelets     Status: Normal   Result Value Ref Range    WBC Count 4.9 4.0 - 11.0 10e3/uL    RBC Count 4.75 3.80 - 5.20 10e6/uL    Hemoglobin 14.3 11.7 - 15.7 g/dL    Hematocrit 43.1 35.0 - 47.0 %    MCV 91 78 - 100 fL    MCH 30.1 26.5 - 33.0 pg    MCHC 33.2 31.5 - 36.5 g/dL    RDW 12.5 10.0 - 15.0 %    Platelet Count 255 150 - 450 10e3/uL   Lipid panel reflex to direct LDL Non-fasting     Status: Abnormal   Result Value Ref Range    Cholesterol 203 (H) <200 mg/dL    Triglycerides 146 <150 mg/dL    Direct " Measure HDL 60 >=50 mg/dL    LDL Cholesterol Calculated 114 (H) <=100 mg/dL    Non HDL Cholesterol 143 (H) <130 mg/dL    Narrative    Cholesterol  Desirable:  <200 mg/dL    Triglycerides  Normal:  Less than 150 mg/dL  Borderline High:  150-199 mg/dL  High:  200-499 mg/dL  Very High:  Greater than or equal to 500 mg/dL    Direct Measure HDL  Female:  Greater than or equal to 50 mg/dL   Male:  Greater than or equal to 40 mg/dL    LDL Cholesterol  Desirable:  <100mg/dL  Above Desirable:  100-129 mg/dL   Borderline High:  130-159 mg/dL   High:  160-189 mg/dL   Very High:  >= 190 mg/dL    Non HDL Cholesterol  Desirable:  130 mg/dL  Above Desirable:  130-159 mg/dL  Borderline High:  160-189 mg/dL  High:  190-219 mg/dL  Very High:  Greater than or equal to 220 mg/dL   Comprehensive metabolic panel (BMP + Alb, Alk Phos, ALT, AST, Total. Bili, TP)     Status: Abnormal   Result Value Ref Range    Sodium 142 136 - 145 mmol/L    Potassium 4.0 3.4 - 5.3 mmol/L    Chloride 106 98 - 107 mmol/L    Carbon Dioxide (CO2) 27 22 - 29 mmol/L    Anion Gap 9 7 - 15 mmol/L    Urea Nitrogen 20.3 8.0 - 23.0 mg/dL    Creatinine 0.83 0.51 - 0.95 mg/dL    Calcium 9.3 8.6 - 10.0 mg/dL    Glucose 93 70 - 99 mg/dL    Alkaline Phosphatase 81 35 - 104 U/L    AST 31 10 - 35 U/L    ALT 48 (H) 10 - 35 U/L    Protein Total 7.2 6.4 - 8.3 g/dL    Albumin 4.3 3.5 - 5.2 g/dL    Bilirubin Total 0.5 <=1.2 mg/dL    GFR Estimate 81 >60 mL/min/1.73m2   TSH with free T4 reflex     Status: Normal   Result Value Ref Range    TSH 0.30 0.30 - 4.20 uIU/mL       ASSESSMENT/PLAN:   (Z00.00) Routine general medical examination at a health care facility  (primary encounter diagnosis)  Comment:   Plan: TSH with free T4 reflex, Comprehensive         metabolic panel (BMP + Alb, Alk Phos, ALT, AST,        Total. Bili, TP), Lipid panel reflex to direct         LDL Non-fasting, CBC with platelets            (Z12.4) Cervical cancer screening  Comment:   Plan: Pap Screen with HPV  "- recommended age 30 - 65         years            (E03.9) Hypothyroidism, unspecified type  Comment:  Controlled no change in treatment plan  Plan: levothyroxine (SYNTHROID/LEVOTHROID) 137 MCG         tablet            (I10) Benign essential hypertension  Comment:  Controlled no change in treatment plan  Plan: losartan-hydrochlorothiazide (HYZAAR) 100-12.5         MG tablet    (E66.01) Morbid obesity (H)  Comment: reviewed diet and working out   Plan:     Patient has been advised of split billing requirements and indicates understanding: Yes      COUNSELING:  Reviewed preventive health counseling, as reflected in patient instructions       Regular exercise       Healthy diet/nutrition       Vision screening       Hearing screening       Folic Acid       Osteoporosis prevention/bone health       ([place])menopause management      BMI:   Estimated body mass index is 39.14 kg/m  as calculated from the following:    Height as of 4/13/22: 1.626 m (5' 4\").    Weight as of 4/13/22: 103.4 kg (228 lb).   Weight management plan: Discussed healthy diet and exercise guidelines      She reports that she has never smoked. She has never used smokeless tobacco.          TONIE Jackman CNP  [facility]  "

## 2023-07-29 ENCOUNTER — HEALTH MAINTENANCE LETTER (OUTPATIENT)
Age: 58
End: 2023-07-29

## 2024-02-06 ENCOUNTER — TELEPHONE (OUTPATIENT)
Dept: FAMILY MEDICINE | Facility: CLINIC | Age: 59
End: 2024-02-06

## 2024-02-06 NOTE — TELEPHONE ENCOUNTER
Patient Quality Outreach    Patient is due for the following:   Breast Cancer Screening - Mammogram    Next Steps:   Schedule a office visit for a mammogram    Type of outreach:    Phone, left message for patient/parent to call back.      Questions for provider review:    None           Kelly Briones, CMA

## 2024-05-04 ENCOUNTER — HEALTH MAINTENANCE LETTER (OUTPATIENT)
Age: 59
End: 2024-05-04

## 2024-10-22 ASSESSMENT — PATIENT HEALTH QUESTIONNAIRE - PHQ9: SUM OF ALL RESPONSES TO PHQ QUESTIONS 1-9: 18

## 2024-10-31 ASSESSMENT — ANXIETY QUESTIONNAIRES: GAD7 TOTAL SCORE: 2
